# Patient Record
Sex: FEMALE | Race: WHITE | NOT HISPANIC OR LATINO | Employment: FULL TIME | URBAN - METROPOLITAN AREA
[De-identification: names, ages, dates, MRNs, and addresses within clinical notes are randomized per-mention and may not be internally consistent; named-entity substitution may affect disease eponyms.]

---

## 2017-01-03 RX ORDER — CARVEDILOL 12.5 MG/1
TABLET ORAL
Qty: 60 TABLET | Refills: 3 | Status: SHIPPED | OUTPATIENT
Start: 2017-01-03 | End: 2017-06-21 | Stop reason: SDUPTHER

## 2017-03-06 RX ORDER — VENLAFAXINE HYDROCHLORIDE 75 MG/1
CAPSULE, EXTENDED RELEASE ORAL
Qty: 90 CAPSULE | Refills: 0 | Status: SHIPPED | OUTPATIENT
Start: 2017-03-06 | End: 2017-06-05 | Stop reason: SDUPTHER

## 2017-06-05 RX ORDER — VENLAFAXINE HYDROCHLORIDE 75 MG/1
75 CAPSULE, EXTENDED RELEASE ORAL DAILY
Qty: 90 CAPSULE | Refills: 0 | Status: SHIPPED | OUTPATIENT
Start: 2017-06-05 | End: 2017-08-31 | Stop reason: SDUPTHER

## 2017-06-21 ENCOUNTER — OFFICE VISIT (OUTPATIENT)
Dept: INTERNAL MEDICINE | Facility: CLINIC | Age: 52
End: 2017-06-21

## 2017-06-21 VITALS
HEIGHT: 66 IN | WEIGHT: 125 LBS | HEART RATE: 64 BPM | DIASTOLIC BLOOD PRESSURE: 90 MMHG | BODY MASS INDEX: 20.09 KG/M2 | SYSTOLIC BLOOD PRESSURE: 160 MMHG

## 2017-06-21 DIAGNOSIS — I10 ESSENTIAL HYPERTENSION: Primary | ICD-10-CM

## 2017-06-21 DIAGNOSIS — F32.89 OTHER DEPRESSION: ICD-10-CM

## 2017-06-21 DIAGNOSIS — F51.01 PRIMARY INSOMNIA: ICD-10-CM

## 2017-06-21 PROBLEM — M26.629 ARTHRALGIA OF TEMPOROMANDIBULAR JOINT: Status: ACTIVE | Noted: 2017-06-21

## 2017-06-21 PROBLEM — F32.A DEPRESSION: Status: ACTIVE | Noted: 2017-06-21

## 2017-06-21 PROCEDURE — 99213 OFFICE O/P EST LOW 20 MIN: CPT | Performed by: INTERNAL MEDICINE

## 2017-06-21 RX ORDER — LISINOPRIL 20 MG/1
20 TABLET ORAL DAILY
Qty: 90 TABLET | Refills: 3 | Status: SHIPPED | OUTPATIENT
Start: 2017-06-21 | End: 2018-06-26 | Stop reason: SDUPTHER

## 2017-06-21 RX ORDER — HYDROCODONE BITARTRATE AND ACETAMINOPHEN 7.5; 325 MG/1; MG/1
TABLET ORAL
COMMUNITY
Start: 2017-06-03 | End: 2019-07-09

## 2017-06-21 RX ORDER — CARVEDILOL 12.5 MG/1
12.5 TABLET ORAL 2 TIMES DAILY WITH MEALS
Qty: 180 TABLET | Refills: 3 | Status: SHIPPED | OUTPATIENT
Start: 2017-06-21 | End: 2017-07-30 | Stop reason: SDUPTHER

## 2017-06-21 RX ORDER — ZOLPIDEM TARTRATE 10 MG/1
10 TABLET ORAL NIGHTLY PRN
Qty: 30 TABLET | Refills: 2 | Status: SHIPPED | OUTPATIENT
Start: 2017-06-21 | End: 2018-06-01

## 2017-06-21 RX ORDER — LISINOPRIL 20 MG/1
20 TABLET ORAL DAILY
Qty: 90 TABLET | Refills: 3 | Status: SHIPPED | OUTPATIENT
Start: 2017-06-21 | End: 2017-06-21 | Stop reason: SDUPTHER

## 2017-06-21 NOTE — PROGRESS NOTES
Patient is a 51 y.o. female who is here for elevated blood pressure.  Chief Complaint   Patient presents with   • Hypertension         HPI:  Here for f/u.  Here BP has been high. Stopped taking the lisinopril.  Having increased dizziness and lightheadedness.  Working 2 jobs.  Continues on effexor.  No ankle edema.      History:    Patient Active Problem List   Diagnosis   • Depression   • Hypertension   • Arthralgia of temporomandibular joint   • Primary insomnia       No past medical history on file.    No past surgical history on file.    Current Outpatient Prescriptions on File Prior to Visit   Medication Sig   • venlafaxine XR (EFFEXOR-XR) 75 MG 24 hr capsule Take 1 capsule by mouth Daily.   • [DISCONTINUED] carvedilol (COREG) 12.5 MG tablet TAKE ONE TABLET BY MOUTH TWICE A DAY     No current facility-administered medications on file prior to visit.        No family history on file.    Social History     Social History   • Marital status:      Spouse name: N/A   • Number of children: N/A   • Years of education: N/A     Occupational History   • Not on file.     Social History Main Topics   • Smoking status: Current Every Day Smoker   • Smokeless tobacco: Never Used   • Alcohol use No   • Drug use: No   • Sexual activity: Not on file     Other Topics Concern   • Not on file     Social History Narrative   • No narrative on file         ROS:    Review of Systems   Constitutional: Positive for fatigue. Negative for chills, diaphoresis, fever and unexpected weight change.   HENT: Negative for congestion, ear pain, hearing loss, nosebleeds, postnasal drip, sinus pressure and sore throat.    Eyes: Negative for pain, discharge and itching.   Respiratory: Negative for cough, chest tightness, shortness of breath and wheezing.    Cardiovascular: Negative for chest pain, palpitations and leg swelling.   Gastrointestinal: Negative for abdominal distention, abdominal pain, blood in stool, constipation, diarrhea, nausea  "and vomiting.   Endocrine: Negative for polydipsia and polyuria.   Genitourinary: Negative for difficulty urinating, dysuria, frequency and hematuria.   Musculoskeletal: Negative for arthralgias, back pain, gait problem, joint swelling and myalgias.   Skin: Negative for rash and wound.   Neurological: Positive for light-headedness and headaches. Negative for dizziness, syncope and weakness.   Psychiatric/Behavioral: Negative for dysphoric mood and sleep disturbance. The patient is not nervous/anxious.        /90  Pulse 64  Ht 66\" (167.6 cm)  Wt 125 lb (56.7 kg)  BMI 20.18 kg/m2    Physical Exam:    Physical Exam   Constitutional: She is oriented to person, place, and time. She appears well-developed and well-nourished.   HENT:   Head: Normocephalic and atraumatic.   Right Ear: External ear normal.   Left Ear: External ear normal.   Mouth/Throat: Oropharynx is clear and moist.   Eyes: Conjunctivae and EOM are normal.   Neck: Normal range of motion. Neck supple.   Cardiovascular: Normal rate, regular rhythm and normal heart sounds.    Pulmonary/Chest: Effort normal and breath sounds normal.   Abdominal: Soft. Bowel sounds are normal.   Musculoskeletal: Normal range of motion.   Lymphadenopathy:     She has no cervical adenopathy.   Neurological: She is alert and oriented to person, place, and time.   Skin: Skin is warm and dry.   Psychiatric: She has a normal mood and affect. Her behavior is normal. Thought content normal.       Procedure:      Discussion/Summary:  htn-restart lisinopril  depression-cont effexor  Other-advised mammogram and colonoscopy      Current Outpatient Prescriptions:   •  carvedilol (COREG) 12.5 MG tablet, Take 1 tablet by mouth 2 (Two) Times a Day With Meals., Disp: 180 tablet, Rfl: 3  •  HYDROcodone-acetaminophen (NORCO) 7.5-325 MG per tablet, , Disp: , Rfl:   •  venlafaxine XR (EFFEXOR-XR) 75 MG 24 hr capsule, Take 1 capsule by mouth Daily., Disp: 90 capsule, Rfl: 0  •  lisinopril " (PRINIVIL,ZESTRIL) 20 MG tablet, Take 1 tablet by mouth Daily., Disp: 90 tablet, Rfl: 3  •  zolpidem (AMBIEN) 10 MG tablet, Take 1 tablet by mouth At Night As Needed for Sleep., Disp: 30 tablet, Rfl: 2        Demetria was seen today for hypertension.    Diagnoses and all orders for this visit:    Essential hypertension  -     Discontinue: lisinopril (PRINIVIL,ZESTRIL) 20 MG tablet; Take 1 tablet by mouth Daily.  -     carvedilol (COREG) 12.5 MG tablet; Take 1 tablet by mouth 2 (Two) Times a Day With Meals.  -     lisinopril (PRINIVIL,ZESTRIL) 20 MG tablet; Take 1 tablet by mouth Daily.    Other depression    Primary insomnia  -     zolpidem (AMBIEN) 10 MG tablet; Take 1 tablet by mouth At Night As Needed for Sleep.

## 2017-07-30 DIAGNOSIS — I10 ESSENTIAL HYPERTENSION: ICD-10-CM

## 2017-07-31 RX ORDER — CARVEDILOL 12.5 MG/1
TABLET ORAL
Qty: 60 TABLET | Refills: 2 | Status: SHIPPED | OUTPATIENT
Start: 2017-07-31 | End: 2017-12-28 | Stop reason: SDUPTHER

## 2017-08-31 RX ORDER — VENLAFAXINE HYDROCHLORIDE 75 MG/1
CAPSULE, EXTENDED RELEASE ORAL
Qty: 30 CAPSULE | Refills: 2 | Status: SHIPPED | OUTPATIENT
Start: 2017-08-31 | End: 2018-05-28 | Stop reason: SDUPTHER

## 2017-11-17 ENCOUNTER — TELEPHONE (OUTPATIENT)
Dept: INTERNAL MEDICINE | Facility: CLINIC | Age: 52
End: 2017-11-17

## 2017-11-17 NOTE — TELEPHONE ENCOUNTER
PATIENT CALLED WANTING TO BE SEEN BEFORE January 19, 2018; SHE'S HAVING BLOOD PRESSURE ISSUES; HER GLANDS IN HER NECK ARE SWOLLEN AND SHE'S HAVING PAIN IN HER RIBS. I TOLD HER ABOUT URGENT CARE AND SHE SAID SHE DIDN'T WANT TO GO THERE. PLEASE GIVE PATIENT A CALL

## 2017-12-28 DIAGNOSIS — I10 ESSENTIAL HYPERTENSION: ICD-10-CM

## 2017-12-28 RX ORDER — CARVEDILOL 12.5 MG/1
TABLET ORAL
Qty: 60 TABLET | Refills: 1 | Status: SHIPPED | OUTPATIENT
Start: 2017-12-28 | End: 2018-04-27 | Stop reason: SDUPTHER

## 2018-04-27 DIAGNOSIS — I10 ESSENTIAL HYPERTENSION: ICD-10-CM

## 2018-04-27 RX ORDER — CARVEDILOL 12.5 MG/1
TABLET ORAL
Qty: 60 TABLET | Refills: 2 | Status: SHIPPED | OUTPATIENT
Start: 2018-04-27 | End: 2018-06-01 | Stop reason: SDUPTHER

## 2018-05-29 RX ORDER — VENLAFAXINE HYDROCHLORIDE 75 MG/1
CAPSULE, EXTENDED RELEASE ORAL
Qty: 30 CAPSULE | Refills: 1 | Status: SHIPPED | OUTPATIENT
Start: 2018-05-29 | End: 2018-06-01

## 2018-06-01 ENCOUNTER — OFFICE VISIT (OUTPATIENT)
Dept: INTERNAL MEDICINE | Facility: CLINIC | Age: 53
End: 2018-06-01

## 2018-06-01 VITALS
HEART RATE: 76 BPM | HEIGHT: 66 IN | BODY MASS INDEX: 19.93 KG/M2 | WEIGHT: 124 LBS | SYSTOLIC BLOOD PRESSURE: 150 MMHG | DIASTOLIC BLOOD PRESSURE: 90 MMHG

## 2018-06-01 DIAGNOSIS — F32.89 OTHER DEPRESSION: ICD-10-CM

## 2018-06-01 DIAGNOSIS — I10 ESSENTIAL HYPERTENSION: Primary | ICD-10-CM

## 2018-06-01 DIAGNOSIS — Z12.11 SCREEN FOR COLON CANCER: ICD-10-CM

## 2018-06-01 DIAGNOSIS — R73.09 ABNORMAL GLUCOSE: ICD-10-CM

## 2018-06-01 DIAGNOSIS — F51.01 PRIMARY INSOMNIA: ICD-10-CM

## 2018-06-01 LAB
ALBUMIN SERPL-MCNC: 4.3 G/DL (ref 3.2–4.8)
ALBUMIN/GLOB SERPL: 1.7 G/DL (ref 1.5–2.5)
ALP SERPL-CCNC: 108 U/L (ref 25–100)
ALT SERPL W P-5'-P-CCNC: 14 U/L (ref 7–40)
ANION GAP SERPL CALCULATED.3IONS-SCNC: 7 MMOL/L (ref 3–11)
ARTICHOKE IGE QN: 145 MG/DL (ref 0–130)
AST SERPL-CCNC: 22 U/L (ref 0–33)
BILIRUB BLD-MCNC: NEGATIVE MG/DL
BILIRUB SERPL-MCNC: 0.6 MG/DL (ref 0.3–1.2)
BUN BLD-MCNC: 7 MG/DL (ref 9–23)
BUN/CREAT SERPL: 10 (ref 7–25)
CALCIUM SPEC-SCNC: 9.2 MG/DL (ref 8.7–10.4)
CHLORIDE SERPL-SCNC: 110 MMOL/L (ref 99–109)
CHOLEST SERPL-MCNC: 195 MG/DL (ref 0–200)
CLARITY, POC: CLEAR
CO2 SERPL-SCNC: 26 MMOL/L (ref 20–31)
COLOR UR: YELLOW
CREAT BLD-MCNC: 0.7 MG/DL (ref 0.6–1.3)
DEPRECATED RDW RBC AUTO: 46.2 FL (ref 37–54)
ERYTHROCYTE [DISTWIDTH] IN BLOOD BY AUTOMATED COUNT: 13.3 % (ref 11.3–14.5)
GFR SERPL CREATININE-BSD FRML MDRD: 88 ML/MIN/1.73
GLOBULIN UR ELPH-MCNC: 2.5 GM/DL
GLUCOSE BLD-MCNC: 102 MG/DL (ref 70–100)
GLUCOSE UR STRIP-MCNC: NEGATIVE MG/DL
HBA1C MFR BLD: 5.6 % (ref 4.8–5.6)
HCT VFR BLD AUTO: 45.7 % (ref 34.5–44)
HDLC SERPL-MCNC: 44 MG/DL (ref 40–60)
HGB BLD-MCNC: 15.1 G/DL (ref 11.5–15.5)
KETONES UR QL: NEGATIVE
LEUKOCYTE EST, POC: NEGATIVE
MCH RBC QN AUTO: 31.7 PG (ref 27–31)
MCHC RBC AUTO-ENTMCNC: 33 G/DL (ref 32–36)
MCV RBC AUTO: 95.8 FL (ref 80–99)
NITRITE UR-MCNC: NEGATIVE MG/ML
PH UR: 6.5 [PH] (ref 5–8)
PLATELET # BLD AUTO: 237 10*3/MM3 (ref 150–450)
PMV BLD AUTO: 10.4 FL (ref 6–12)
POTASSIUM BLD-SCNC: 4.1 MMOL/L (ref 3.5–5.5)
PROT SERPL-MCNC: 6.8 G/DL (ref 5.7–8.2)
PROT UR STRIP-MCNC: NEGATIVE MG/DL
RBC # BLD AUTO: 4.77 10*6/MM3 (ref 3.89–5.14)
RBC # UR STRIP: NEGATIVE /UL
SODIUM BLD-SCNC: 143 MMOL/L (ref 132–146)
SP GR UR: 1 (ref 1–1.03)
TRIGL SERPL-MCNC: 147 MG/DL (ref 0–150)
TSH SERPL DL<=0.05 MIU/L-ACNC: 3.25 MIU/ML (ref 0.35–5.35)
UROBILINOGEN UR QL: NORMAL
VIT B12 BLD-MCNC: 259 PG/ML (ref 211–911)
WBC NRBC COR # BLD: 9.02 10*3/MM3 (ref 3.5–10.8)

## 2018-06-01 PROCEDURE — 83036 HEMOGLOBIN GLYCOSYLATED A1C: CPT | Performed by: INTERNAL MEDICINE

## 2018-06-01 PROCEDURE — 81003 URINALYSIS AUTO W/O SCOPE: CPT | Performed by: INTERNAL MEDICINE

## 2018-06-01 PROCEDURE — 84443 ASSAY THYROID STIM HORMONE: CPT | Performed by: INTERNAL MEDICINE

## 2018-06-01 PROCEDURE — 85027 COMPLETE CBC AUTOMATED: CPT | Performed by: INTERNAL MEDICINE

## 2018-06-01 PROCEDURE — 80053 COMPREHEN METABOLIC PANEL: CPT | Performed by: INTERNAL MEDICINE

## 2018-06-01 PROCEDURE — 82607 VITAMIN B-12: CPT | Performed by: INTERNAL MEDICINE

## 2018-06-01 PROCEDURE — 99214 OFFICE O/P EST MOD 30 MIN: CPT | Performed by: INTERNAL MEDICINE

## 2018-06-01 PROCEDURE — 80061 LIPID PANEL: CPT | Performed by: INTERNAL MEDICINE

## 2018-06-01 RX ORDER — CARVEDILOL 25 MG/1
25 TABLET ORAL 2 TIMES DAILY WITH MEALS
Qty: 60 TABLET | Refills: 5 | Status: SHIPPED | OUTPATIENT
Start: 2018-06-01 | End: 2021-06-07

## 2018-06-01 RX ORDER — GABAPENTIN 100 MG/1
CAPSULE ORAL
COMMUNITY
Start: 2018-05-15 | End: 2021-06-07

## 2018-06-01 RX ORDER — DULOXETIN HYDROCHLORIDE 60 MG/1
60 CAPSULE, DELAYED RELEASE ORAL DAILY
Qty: 30 CAPSULE | Refills: 5 | Status: SHIPPED | OUTPATIENT
Start: 2018-06-01 | End: 2018-11-27 | Stop reason: SDUPTHER

## 2018-06-01 NOTE — PROGRESS NOTES
Patient is a 52 y.o. female who is here for a follow up of chronic conditions.  Chief Complaint   Patient presents with   • Hypertension   • Depression         HPI:    Here for f/u.  More depressed.  Feels anxious also.  Sleeping well.  Not using ambien.  Motivation is poor.  Poor concentration.  Working a lot.  Recently placed on gabapentin for feet burning.  Occasional palpitations.  Compliant with BP meds.   History:    Patient Active Problem List   Diagnosis   • Depression   • Hypertension   • Arthralgia of temporomandibular joint   • Primary insomnia       No past medical history on file.    No past surgical history on file.    Current Outpatient Prescriptions on File Prior to Visit   Medication Sig   • HYDROcodone-acetaminophen (NORCO) 7.5-325 MG per tablet    • lisinopril (PRINIVIL,ZESTRIL) 20 MG tablet Take 1 tablet by mouth Daily.   • [DISCONTINUED] carvedilol (COREG) 12.5 MG tablet TAKE ONE TABLET BY MOUTH TWICE A DAY   • [DISCONTINUED] venlafaxine XR (EFFEXOR-XR) 75 MG 24 hr capsule TAKE ONE CAPSULE BY MOUTH DAILY   • [DISCONTINUED] zolpidem (AMBIEN) 10 MG tablet Take 1 tablet by mouth At Night As Needed for Sleep.     No current facility-administered medications on file prior to visit.        No family history on file.    Social History     Social History   • Marital status:      Spouse name: N/A   • Number of children: N/A   • Years of education: N/A     Occupational History   • Not on file.     Social History Main Topics   • Smoking status: Current Every Day Smoker   • Smokeless tobacco: Never Used   • Alcohol use No   • Drug use: No   • Sexual activity: Not on file     Other Topics Concern   • Not on file     Social History Narrative   • No narrative on file         ROS:    Review of Systems   Constitutional: Positive for fatigue. Negative for chills, diaphoresis, fever and unexpected weight change.   HENT: Negative for congestion, ear pain, hearing loss, nosebleeds, postnasal drip, sinus  "pressure and sore throat.    Eyes: Negative for pain, discharge and itching.   Respiratory: Negative for cough, chest tightness, shortness of breath and wheezing.    Cardiovascular: Positive for palpitations. Negative for chest pain and leg swelling.   Gastrointestinal: Negative for abdominal distention, abdominal pain, blood in stool, constipation, diarrhea, nausea and vomiting.   Endocrine: Negative for polydipsia and polyuria.   Genitourinary: Negative for difficulty urinating, dysuria, frequency and hematuria.   Musculoskeletal: Negative for arthralgias, back pain, gait problem, joint swelling and myalgias.   Skin: Negative for rash and wound.   Neurological: Positive for light-headedness and headaches. Negative for dizziness, syncope and weakness.   Psychiatric/Behavioral: Negative for dysphoric mood and sleep disturbance. The patient is not nervous/anxious.        /90   Pulse 76   Ht 167.6 cm (65.98\")   Wt 56.2 kg (124 lb)   BMI 20.02 kg/m²     Physical Exam:    Physical Exam   Constitutional: She is oriented to person, place, and time. She appears well-developed and well-nourished.   HENT:   Head: Normocephalic and atraumatic.   Right Ear: External ear normal.   Left Ear: External ear normal.   Mouth/Throat: Oropharynx is clear and moist.   Eyes: Conjunctivae and EOM are normal.   Neck: Normal range of motion. Neck supple.   Cardiovascular: Normal rate, regular rhythm and normal heart sounds.    Pulmonary/Chest: Effort normal and breath sounds normal.   Abdominal: Soft. Bowel sounds are normal.   Musculoskeletal: Normal range of motion. She exhibits tenderness (diffuse).   Lymphadenopathy:     She has no cervical adenopathy.   Neurological: She is alert and oriented to person, place, and time.   Skin: Skin is warm and dry.   Psychiatric: She has a normal mood and affect. Her behavior is normal. Thought content normal.       Procedure:      Discussion/Summary:    Htn/palpitations-increase " coreg  depression-wean to duloxetine  LE paresthesia-cont gabapentin, duloxetine should help  Other-labs today and schedule screening colonoscopy    Labs noted and dw patient, advised MVI qd and will mail out diet handout      Current Outpatient Prescriptions:   •  carvedilol (COREG) 25 MG tablet, Take 1 tablet by mouth 2 (Two) Times a Day With Meals., Disp: 60 tablet, Rfl: 5  •  gabapentin (NEURONTIN) 100 MG capsule, , Disp: , Rfl:   •  HYDROcodone-acetaminophen (NORCO) 7.5-325 MG per tablet, , Disp: , Rfl:   •  lisinopril (PRINIVIL,ZESTRIL) 20 MG tablet, Take 1 tablet by mouth Daily., Disp: 90 tablet, Rfl: 3  •  DULoxetine (CYMBALTA) 60 MG capsule, Take 1 capsule by mouth Daily., Disp: 30 capsule, Rfl: 5        Demetria was seen today for hypertension and depression.    Diagnoses and all orders for this visit:    Essential hypertension  -     carvedilol (COREG) 25 MG tablet; Take 1 tablet by mouth 2 (Two) Times a Day With Meals.  -     Comprehensive Metabolic Panel  -     Lipid Panel  -     POC Urinalysis Dipstick, Automated    Other depression  -     DULoxetine (CYMBALTA) 60 MG capsule; Take 1 capsule by mouth Daily.  -     CBC (No Diff)  -     TSH  -     Vitamin B12    Primary insomnia    Screen for colon cancer  -     Ambulatory Referral For Screening Colonoscopy    Abnormal glucose  -     Hemoglobin A1c

## 2018-06-26 DIAGNOSIS — I10 ESSENTIAL HYPERTENSION: ICD-10-CM

## 2018-06-26 RX ORDER — LISINOPRIL 20 MG/1
TABLET ORAL
Qty: 30 TABLET | Refills: 2 | Status: SHIPPED | OUTPATIENT
Start: 2018-06-26 | End: 2018-09-27 | Stop reason: SDUPTHER

## 2018-07-25 DIAGNOSIS — Z12.11 SCREENING FOR COLON CANCER: Primary | ICD-10-CM

## 2018-09-10 RX ORDER — CARVEDILOL 12.5 MG/1
TABLET ORAL
Qty: 60 TABLET | Refills: 1 | Status: SHIPPED | OUTPATIENT
Start: 2018-09-10 | End: 2018-11-28 | Stop reason: SDUPTHER

## 2018-09-27 DIAGNOSIS — I10 ESSENTIAL HYPERTENSION: ICD-10-CM

## 2018-09-27 RX ORDER — LISINOPRIL 20 MG/1
TABLET ORAL
Qty: 30 TABLET | Refills: 1 | Status: SHIPPED | OUTPATIENT
Start: 2018-09-27 | End: 2018-11-28 | Stop reason: SDUPTHER

## 2018-11-27 DIAGNOSIS — F32.89 OTHER DEPRESSION: ICD-10-CM

## 2018-11-27 RX ORDER — DULOXETIN HYDROCHLORIDE 60 MG/1
CAPSULE, DELAYED RELEASE ORAL
Qty: 10 CAPSULE | Refills: 4 | Status: SHIPPED | OUTPATIENT
Start: 2018-11-27 | End: 2019-04-27 | Stop reason: SDUPTHER

## 2018-11-28 DIAGNOSIS — I10 ESSENTIAL HYPERTENSION: ICD-10-CM

## 2018-11-28 RX ORDER — LISINOPRIL 20 MG/1
TABLET ORAL
Qty: 30 TABLET | Refills: 0 | Status: SHIPPED | OUTPATIENT
Start: 2018-11-28 | End: 2018-12-31 | Stop reason: SDUPTHER

## 2018-11-28 RX ORDER — CARVEDILOL 12.5 MG/1
TABLET ORAL
Qty: 60 TABLET | Refills: 0 | Status: SHIPPED | OUTPATIENT
Start: 2018-11-28 | End: 2018-12-31 | Stop reason: SDUPTHER

## 2018-12-31 DIAGNOSIS — I10 ESSENTIAL HYPERTENSION: ICD-10-CM

## 2018-12-31 RX ORDER — LISINOPRIL 20 MG/1
TABLET ORAL
Qty: 30 TABLET | Refills: 0 | Status: SHIPPED | OUTPATIENT
Start: 2018-12-31 | End: 2019-01-29 | Stop reason: SDUPTHER

## 2018-12-31 RX ORDER — CARVEDILOL 12.5 MG/1
TABLET ORAL
Qty: 60 TABLET | Refills: 0 | Status: SHIPPED | OUTPATIENT
Start: 2018-12-31 | End: 2019-01-29 | Stop reason: SDUPTHER

## 2019-01-29 DIAGNOSIS — I10 ESSENTIAL HYPERTENSION: ICD-10-CM

## 2019-01-29 RX ORDER — LISINOPRIL 20 MG/1
TABLET ORAL
Qty: 30 TABLET | Refills: 0 | Status: SHIPPED | OUTPATIENT
Start: 2019-01-29 | End: 2019-02-27 | Stop reason: SDUPTHER

## 2019-01-29 RX ORDER — CARVEDILOL 12.5 MG/1
TABLET ORAL
Qty: 60 TABLET | Refills: 0 | Status: SHIPPED | OUTPATIENT
Start: 2019-01-29 | End: 2019-07-09

## 2019-02-27 DIAGNOSIS — I10 ESSENTIAL HYPERTENSION: ICD-10-CM

## 2019-02-27 RX ORDER — LISINOPRIL 20 MG/1
TABLET ORAL
Qty: 30 TABLET | Refills: 0 | Status: SHIPPED | OUTPATIENT
Start: 2019-02-27 | End: 2019-03-28 | Stop reason: SDUPTHER

## 2019-03-28 DIAGNOSIS — I10 ESSENTIAL HYPERTENSION: ICD-10-CM

## 2019-03-28 RX ORDER — LISINOPRIL 20 MG/1
TABLET ORAL
Qty: 30 TABLET | Refills: 5 | Status: SHIPPED | OUTPATIENT
Start: 2019-03-28 | End: 2019-09-26 | Stop reason: SDUPTHER

## 2019-04-27 DIAGNOSIS — F32.89 OTHER DEPRESSION: ICD-10-CM

## 2019-04-29 RX ORDER — DULOXETIN HYDROCHLORIDE 60 MG/1
CAPSULE, DELAYED RELEASE ORAL
Qty: 30 CAPSULE | Refills: 3 | Status: SHIPPED | OUTPATIENT
Start: 2019-04-29 | End: 2019-08-28 | Stop reason: SDUPTHER

## 2019-07-09 ENCOUNTER — OFFICE VISIT (OUTPATIENT)
Dept: INTERNAL MEDICINE | Facility: CLINIC | Age: 54
End: 2019-07-09

## 2019-07-09 VITALS
WEIGHT: 120 LBS | DIASTOLIC BLOOD PRESSURE: 90 MMHG | HEART RATE: 68 BPM | BODY MASS INDEX: 19.29 KG/M2 | SYSTOLIC BLOOD PRESSURE: 160 MMHG | HEIGHT: 66 IN

## 2019-07-09 DIAGNOSIS — F51.01 PRIMARY INSOMNIA: ICD-10-CM

## 2019-07-09 DIAGNOSIS — D22.9 ATYPICAL MOLE: ICD-10-CM

## 2019-07-09 DIAGNOSIS — I10 ESSENTIAL HYPERTENSION: Primary | ICD-10-CM

## 2019-07-09 DIAGNOSIS — F32.89 OTHER DEPRESSION: ICD-10-CM

## 2019-07-09 DIAGNOSIS — M25.549 PAIN IN MULTIPLE FINGER JOINTS: ICD-10-CM

## 2019-07-09 LAB
BILIRUB BLD-MCNC: NEGATIVE MG/DL
CLARITY, POC: CLEAR
COLOR UR: YELLOW
GLUCOSE UR STRIP-MCNC: NEGATIVE MG/DL
KETONES UR QL: NEGATIVE
LEUKOCYTE EST, POC: NEGATIVE
NITRITE UR-MCNC: NEGATIVE MG/ML
PH UR: 7 [PH] (ref 5–8)
PROT UR STRIP-MCNC: NEGATIVE MG/DL
RBC # UR STRIP: NEGATIVE /UL
SP GR UR: 1 (ref 1–1.03)
UROBILINOGEN UR QL: NORMAL

## 2019-07-09 PROCEDURE — 86431 RHEUMATOID FACTOR QUANT: CPT | Performed by: INTERNAL MEDICINE

## 2019-07-09 PROCEDURE — 80053 COMPREHEN METABOLIC PANEL: CPT | Performed by: INTERNAL MEDICINE

## 2019-07-09 PROCEDURE — 84443 ASSAY THYROID STIM HORMONE: CPT | Performed by: INTERNAL MEDICINE

## 2019-07-09 PROCEDURE — 82306 VITAMIN D 25 HYDROXY: CPT | Performed by: INTERNAL MEDICINE

## 2019-07-09 PROCEDURE — 86140 C-REACTIVE PROTEIN: CPT | Performed by: INTERNAL MEDICINE

## 2019-07-09 PROCEDURE — 86038 ANTINUCLEAR ANTIBODIES: CPT | Performed by: INTERNAL MEDICINE

## 2019-07-09 PROCEDURE — 85027 COMPLETE CBC AUTOMATED: CPT | Performed by: INTERNAL MEDICINE

## 2019-07-09 PROCEDURE — 84550 ASSAY OF BLOOD/URIC ACID: CPT | Performed by: INTERNAL MEDICINE

## 2019-07-09 PROCEDURE — 81003 URINALYSIS AUTO W/O SCOPE: CPT | Performed by: INTERNAL MEDICINE

## 2019-07-09 PROCEDURE — 86200 CCP ANTIBODY: CPT | Performed by: INTERNAL MEDICINE

## 2019-07-09 PROCEDURE — 99214 OFFICE O/P EST MOD 30 MIN: CPT | Performed by: INTERNAL MEDICINE

## 2019-07-09 PROCEDURE — 85652 RBC SED RATE AUTOMATED: CPT | Performed by: INTERNAL MEDICINE

## 2019-07-09 NOTE — PROGRESS NOTES
Patient is a 53 y.o. female who is here for a follow up of chronic conditions.  Chief Complaint   Patient presents with   • Hypertension         HPI:    Here for f/u.  Past 2 weeks with dysuria and low grade temp.  Some incomplete emptying and nocturia.    Also having increased rib pain and in feet and hands.  Has hx of psoriasis.  Difficulty hearing from left ear.  No recent swimming.      History:     Patient Active Problem List   Diagnosis   • Depression   • Hypertension   • Arthralgia of temporomandibular joint   • Primary insomnia   • Pain in multiple finger joints   • Atypical mole       No past medical history on file.    No past surgical history on file.    Current Outpatient Medications on File Prior to Visit   Medication Sig   • carvedilol (COREG) 25 MG tablet Take 1 tablet by mouth 2 (Two) Times a Day With Meals.   • DULoxetine (CYMBALTA) 60 MG capsule TAKE ONE CAPSULE BY MOUTH DAILY   • gabapentin (NEURONTIN) 100 MG capsule    • lisinopril (PRINIVIL,ZESTRIL) 20 MG tablet TAKE ONE TABLET BY MOUTH DAILY   • [DISCONTINUED] carvedilol (COREG) 12.5 MG tablet TAKE ONE TABLET BY MOUTH TWICE A DAY   • [DISCONTINUED] HYDROcodone-acetaminophen (NORCO) 7.5-325 MG per tablet    • [DISCONTINUED] Sod Picosulfate-Mag Ox-Cit Acd 10-3.5-12 MG-GM -GM/160ML solution Take 1 kit by mouth Take As Directed. Follow instructions that were mailed to your home. If you didn't receive these call [phone].     No current facility-administered medications on file prior to visit.        No family history on file.    Social History     Socioeconomic History   • Marital status:      Spouse name: Not on file   • Number of children: Not on file   • Years of education: Not on file   • Highest education level: Not on file   Tobacco Use   • Smoking status: Current Every Day Smoker   • Smokeless tobacco: Never Used   Substance and Sexual Activity   • Alcohol use: No   • Drug use: No         Review of Systems   Constitutional:  "Positive for fatigue. Negative for chills, diaphoresis, fever and unexpected weight change.   HENT: Positive for hearing loss. Negative for congestion, ear pain, nosebleeds, postnasal drip, sinus pressure and sore throat.    Eyes: Negative for pain, discharge and itching.   Respiratory: Negative for cough, chest tightness, shortness of breath and wheezing.    Cardiovascular: Positive for palpitations. Negative for chest pain and leg swelling.   Gastrointestinal: Negative for abdominal distention, abdominal pain, blood in stool, constipation, diarrhea, nausea and vomiting.   Endocrine: Negative for polydipsia and polyuria.   Genitourinary: Positive for frequency and urgency. Negative for difficulty urinating, dysuria and hematuria.   Musculoskeletal: Positive for arthralgias, back pain and myalgias. Negative for gait problem and joint swelling.   Skin: Negative for rash and wound.   Neurological: Positive for light-headedness and headaches. Negative for dizziness, syncope and weakness.   Psychiatric/Behavioral: Positive for dysphoric mood. Negative for sleep disturbance. The patient is not nervous/anxious.        /90   Pulse 68   Ht 167.6 cm (66\")   Wt 54.4 kg (120 lb)   BMI 19.37 kg/m²       Physical Exam   Constitutional: She is oriented to person, place, and time. She appears well-developed and well-nourished.   HENT:   Head: Normocephalic and atraumatic.   Right Ear: External ear normal.   Left Ear: External ear normal.   Mouth/Throat: Oropharynx is clear and moist.   Eyes: Conjunctivae and EOM are normal.   Neck: Normal range of motion. Neck supple.   Cardiovascular: Normal rate, regular rhythm and normal heart sounds.   Pulmonary/Chest: Effort normal and breath sounds normal.   Abdominal: Soft. Bowel sounds are normal.   Musculoskeletal: Normal range of motion. She exhibits tenderness (diffuse).   Pain on flexion past 45 degrees   Lymphadenopathy:     She has no cervical adenopathy.   Neurological: " She is alert and oriented to person, place, and time.   Skin: Skin is warm and dry.   Breast, Left- with irregular SD on 6 oclock   Psychiatric: She has a normal mood and affect. Her behavior is normal. Thought content normal.       Procedure:      Discussion/Summary:    Htn/palpitations-increase coreg 1/2 bid  depression-cont duloxetine, add abilify  LE paresthesia-cont gabapentin and duloxetine  Left breast SD-plastic surgery to see  Pain in multiple joints-labs today  Other-labs today and advised mammogram and colonoscopy     Labs noted and dw patient, advised MVI qd  Advised Vit D 2000 IU qd        Current Outpatient Medications:   •  carvedilol (COREG) 25 MG tablet, Take 1 tablet by mouth 2 (Two) Times a Day With Meals., Disp: 60 tablet, Rfl: 5  •  DULoxetine (CYMBALTA) 60 MG capsule, TAKE ONE CAPSULE BY MOUTH DAILY, Disp: 30 capsule, Rfl: 3  •  gabapentin (NEURONTIN) 100 MG capsule, , Disp: , Rfl:   •  lisinopril (PRINIVIL,ZESTRIL) 20 MG tablet, TAKE ONE TABLET BY MOUTH DAILY, Disp: 30 tablet, Rfl: 5  •  ARIPiprazole, sensor, (ABILIFY MYCITE) 2 MG tablet, Take 1 tablet by mouth Every Morning., Disp: 30 tablet, Rfl: 5        Demetria was seen today for hypertension.    Diagnoses and all orders for this visit:    Essential hypertension  -     CBC (No Diff)  -     Comprehensive Metabolic Panel  -     POC Urinalysis Dipstick, Automated    Primary insomnia    Other depression  -     TSH  -     ARIPiprazole, sensor, (ABILIFY MYCITE) 2 MG tablet; Take 1 tablet by mouth Every Morning.    Pain in multiple finger joints  -     Vitamin D 25 Hydroxy  -     MIRIAM With / DsDNA, RNP, Sjogrens A / B, Smith  -     C-reactive Protein  -     Cyclic Citrul Peptide Antibody, IgG / IgA  -     Rheumatoid Factor  -     Sedimentation Rate  -     Uric Acid    Atypical mole  -     Ambulatory Referral to Plastic Surgery

## 2019-07-10 LAB
25(OH)D3 SERPL-MCNC: 17.8 NG/ML (ref 30–100)
ALBUMIN SERPL-MCNC: 4.3 G/DL (ref 3.5–5.2)
ALBUMIN/GLOB SERPL: 1.7 G/DL
ALP SERPL-CCNC: 87 U/L (ref 39–117)
ALT SERPL W P-5'-P-CCNC: 14 U/L (ref 1–33)
ANION GAP SERPL CALCULATED.3IONS-SCNC: 12 MMOL/L (ref 5–15)
AST SERPL-CCNC: 18 U/L (ref 1–32)
BILIRUB SERPL-MCNC: 0.5 MG/DL (ref 0.2–1.2)
BUN BLD-MCNC: 7 MG/DL (ref 6–20)
BUN/CREAT SERPL: 10.1 (ref 7–25)
CALCIUM SPEC-SCNC: 9.8 MG/DL (ref 8.6–10.5)
CHLORIDE SERPL-SCNC: 104 MMOL/L (ref 98–107)
CHROMATIN AB SERPL-ACNC: <10 IU/ML (ref 0–14)
CO2 SERPL-SCNC: 27 MMOL/L (ref 22–29)
CREAT BLD-MCNC: 0.69 MG/DL (ref 0.57–1)
CRP SERPL-MCNC: <0.03 MG/DL (ref 0–0.5)
DEPRECATED RDW RBC AUTO: 48.3 FL (ref 37–54)
ERYTHROCYTE [DISTWIDTH] IN BLOOD BY AUTOMATED COUNT: 13 % (ref 12.3–15.4)
ERYTHROCYTE [SEDIMENTATION RATE] IN BLOOD: 4 MM/HR (ref 0–30)
GFR SERPL CREATININE-BSD FRML MDRD: 89 ML/MIN/1.73
GLOBULIN UR ELPH-MCNC: 2.5 GM/DL
GLUCOSE BLD-MCNC: 74 MG/DL (ref 65–99)
HCT VFR BLD AUTO: 46.5 % (ref 34–46.6)
HGB BLD-MCNC: 14.2 G/DL (ref 12–15.9)
MCH RBC QN AUTO: 30.6 PG (ref 26.6–33)
MCHC RBC AUTO-ENTMCNC: 30.5 G/DL (ref 31.5–35.7)
MCV RBC AUTO: 100.2 FL (ref 79–97)
PLATELET # BLD AUTO: 221 10*3/MM3 (ref 140–450)
PMV BLD AUTO: 10.6 FL (ref 6–12)
POTASSIUM BLD-SCNC: 4.3 MMOL/L (ref 3.5–5.2)
PROT SERPL-MCNC: 6.8 G/DL (ref 6–8.5)
RBC # BLD AUTO: 4.64 10*6/MM3 (ref 3.77–5.28)
SODIUM BLD-SCNC: 143 MMOL/L (ref 136–145)
TSH SERPL DL<=0.05 MIU/L-ACNC: 2.07 MIU/ML (ref 0.27–4.2)
URATE SERPL-MCNC: 3.7 MG/DL (ref 2.4–5.7)
WBC NRBC COR # BLD: 7.21 10*3/MM3 (ref 3.4–10.8)

## 2019-07-11 LAB — ANA SER QL: NEGATIVE

## 2019-07-12 DIAGNOSIS — D22.9 ATYPICAL MOLE: Primary | ICD-10-CM

## 2019-07-12 LAB — CCP IGA+IGG SERPL IA-ACNC: 7 UNITS (ref 0–19)

## 2019-08-19 RX ORDER — CARVEDILOL 12.5 MG/1
TABLET ORAL
Qty: 60 TABLET | Refills: 5 | Status: SHIPPED | OUTPATIENT
Start: 2019-08-19 | End: 2020-06-10

## 2019-08-21 RX ORDER — ARIPIPRAZOLE 2 MG/1
2 TABLET ORAL DAILY
Qty: 30 TABLET | Refills: 5 | Status: SHIPPED | OUTPATIENT
Start: 2019-08-21 | End: 2020-06-15

## 2019-08-28 DIAGNOSIS — F32.89 OTHER DEPRESSION: ICD-10-CM

## 2019-08-28 RX ORDER — DULOXETIN HYDROCHLORIDE 60 MG/1
60 CAPSULE, DELAYED RELEASE ORAL DAILY
Qty: 30 CAPSULE | Refills: 3 | Status: SHIPPED | OUTPATIENT
Start: 2019-08-28 | End: 2019-12-26

## 2019-09-24 ENCOUNTER — TELEPHONE (OUTPATIENT)
Dept: URGENT CARE | Facility: CLINIC | Age: 54
End: 2019-09-24

## 2019-09-24 NOTE — TELEPHONE ENCOUNTER
Terrible cough for more than a week.  Coughing fit at work today.  Felt like chest was hurting and hands/feet turned red.  Tachycardic.  Nurse at work states it might be walking PNA.  Was told by PCP and Nurse at work to go to ER.  Patient states she is trying to avoid ER due to cost.  She asked PCP what other options she has and they told her to come here.  Per KIN Nair PA-C, advised patient she can be evaluated here but still may end up being sent to ER.  Patient stated understanding and that she will start here.

## 2019-09-26 DIAGNOSIS — I10 ESSENTIAL HYPERTENSION: ICD-10-CM

## 2019-09-26 RX ORDER — LISINOPRIL 20 MG/1
TABLET ORAL
Qty: 30 TABLET | Refills: 4 | Status: SHIPPED | OUTPATIENT
Start: 2019-09-26 | End: 2020-03-02

## 2019-12-26 DIAGNOSIS — F32.89 OTHER DEPRESSION: ICD-10-CM

## 2019-12-26 RX ORDER — DULOXETIN HYDROCHLORIDE 60 MG/1
CAPSULE, DELAYED RELEASE ORAL
Qty: 30 CAPSULE | Refills: 2 | Status: SHIPPED | OUTPATIENT
Start: 2019-12-26 | End: 2020-03-25

## 2020-03-01 DIAGNOSIS — I10 ESSENTIAL HYPERTENSION: ICD-10-CM

## 2020-03-02 RX ORDER — LISINOPRIL 20 MG/1
TABLET ORAL
Qty: 30 TABLET | Refills: 3 | Status: SHIPPED | OUTPATIENT
Start: 2020-03-02 | End: 2020-07-09

## 2020-03-25 DIAGNOSIS — F32.89 OTHER DEPRESSION: ICD-10-CM

## 2020-03-25 RX ORDER — DULOXETIN HYDROCHLORIDE 60 MG/1
CAPSULE, DELAYED RELEASE ORAL
Qty: 30 CAPSULE | Refills: 1 | Status: SHIPPED | OUTPATIENT
Start: 2020-03-25 | End: 2020-05-26

## 2020-05-26 DIAGNOSIS — F32.89 OTHER DEPRESSION: ICD-10-CM

## 2020-05-26 RX ORDER — DULOXETIN HYDROCHLORIDE 60 MG/1
CAPSULE, DELAYED RELEASE ORAL
Qty: 30 CAPSULE | Refills: 3 | Status: SHIPPED | OUTPATIENT
Start: 2020-05-26 | End: 2020-06-15

## 2020-06-10 RX ORDER — CARVEDILOL 12.5 MG/1
TABLET ORAL
Qty: 60 TABLET | Refills: 4 | Status: SHIPPED | OUTPATIENT
Start: 2020-06-10 | End: 2021-03-31

## 2020-06-15 ENCOUNTER — E-VISIT (OUTPATIENT)
Dept: INTERNAL MEDICINE | Facility: CLINIC | Age: 55
End: 2020-06-15

## 2020-06-15 DIAGNOSIS — N30.00 ACUTE CYSTITIS WITHOUT HEMATURIA: Primary | ICD-10-CM

## 2020-06-15 PROCEDURE — 99421 OL DIG E/M SVC 5-10 MIN: CPT | Performed by: INTERNAL MEDICINE

## 2020-06-15 RX ORDER — NITROFURANTOIN 25; 75 MG/1; MG/1
100 CAPSULE ORAL 2 TIMES DAILY
Qty: 10 CAPSULE | Refills: 0 | OUTPATIENT
Start: 2020-06-15 | End: 2020-09-08

## 2020-06-15 NOTE — PROGRESS NOTES
Demetria MONSON Jacob    1965  [id number]    I have reviewed the e-Visit questionnaire and patient's answers, my assessment and plan are as follows:    HPI    Patient c/o urinary symptoms of frequency/incomplete emptying and lower abdominal pains.  Has hx of UTI.  No fever or chills or rigors.  No hematuria.  No recent antibiotics or urinary instrumentation or surgery    Review of Systems - General ROS: negative for - chills or fatigue  Genito-Urinary ROS: positive for - dysuria, pelvic pain and urinary frequency/urgency        Diagnoses and all orders for this visit:    Acute cystitis without hematuria  -     nitrofurantoin, macrocrystal-monohydrate, (Macrobid) 100 MG capsule; Take 1 capsule by mouth 2 (Two) Times a Day.        Any medications prescribed have been sent electronically to   [facility] [address] [place] [address] - [address] [place] [address] AT [facility] [employer] [facility] [employer] [address] - [phone] PH - [phone] [address] [address]  Phone: [phone] Fax: [phone]        Yossi Auguste MD  06/15/20  12:43 PM

## 2020-07-09 DIAGNOSIS — I10 ESSENTIAL HYPERTENSION: ICD-10-CM

## 2020-07-09 RX ORDER — LISINOPRIL 20 MG/1
TABLET ORAL
Qty: 30 TABLET | Refills: 2 | Status: SHIPPED | OUTPATIENT
Start: 2020-07-09 | End: 2020-10-12

## 2020-09-24 RX ORDER — DULOXETIN HYDROCHLORIDE 60 MG/1
CAPSULE, DELAYED RELEASE ORAL
Qty: 30 CAPSULE | Refills: 2 | Status: SHIPPED | OUTPATIENT
Start: 2020-09-24 | End: 2020-12-24

## 2020-10-11 DIAGNOSIS — I10 ESSENTIAL HYPERTENSION: ICD-10-CM

## 2020-10-12 RX ORDER — LISINOPRIL 20 MG/1
TABLET ORAL
Qty: 90 TABLET | Refills: 1 | Status: SHIPPED | OUTPATIENT
Start: 2020-10-12 | End: 2021-04-26

## 2020-12-24 RX ORDER — DULOXETIN HYDROCHLORIDE 60 MG/1
CAPSULE, DELAYED RELEASE ORAL
Qty: 90 CAPSULE | Refills: 3 | Status: SHIPPED | OUTPATIENT
Start: 2020-12-24 | End: 2021-12-23

## 2021-03-15 ENCOUNTER — TRANSCRIBE ORDERS (OUTPATIENT)
Dept: ADMINISTRATIVE | Facility: HOSPITAL | Age: 56
End: 2021-03-15

## 2021-03-15 ENCOUNTER — HOSPITAL ENCOUNTER (OUTPATIENT)
Dept: GENERAL RADIOLOGY | Facility: HOSPITAL | Age: 56
Discharge: HOME | End: 2021-03-15
Admitting: NURSE PRACTITIONER

## 2021-03-15 DIAGNOSIS — M25.551 RIGHT HIP PAIN: ICD-10-CM

## 2021-03-15 DIAGNOSIS — M25.551 RIGHT HIP PAIN: Primary | ICD-10-CM

## 2021-03-15 PROCEDURE — 73502 X-RAY EXAM HIP UNI 2-3 VIEWS: CPT

## 2021-03-31 RX ORDER — CARVEDILOL 12.5 MG/1
TABLET ORAL
Qty: 180 TABLET | Refills: 3 | Status: SHIPPED | OUTPATIENT
Start: 2021-03-31 | End: 2022-04-28

## 2021-04-26 DIAGNOSIS — I10 ESSENTIAL HYPERTENSION: ICD-10-CM

## 2021-04-26 RX ORDER — LISINOPRIL 20 MG/1
TABLET ORAL
Qty: 90 TABLET | Refills: 3 | Status: SHIPPED | OUTPATIENT
Start: 2021-04-26 | End: 2021-11-09

## 2021-06-07 ENCOUNTER — OFFICE VISIT (OUTPATIENT)
Dept: ORTHOPEDIC SURGERY | Facility: CLINIC | Age: 56
End: 2021-06-07

## 2021-06-07 VITALS
HEART RATE: 91 BPM | BODY MASS INDEX: 17.36 KG/M2 | WEIGHT: 108 LBS | SYSTOLIC BLOOD PRESSURE: 155 MMHG | HEIGHT: 66 IN | DIASTOLIC BLOOD PRESSURE: 98 MMHG

## 2021-06-07 DIAGNOSIS — M25.551 RIGHT HIP PAIN: Primary | ICD-10-CM

## 2021-06-07 PROCEDURE — 99204 OFFICE O/P NEW MOD 45 MIN: CPT | Performed by: ORTHOPAEDIC SURGERY

## 2021-06-07 RX ORDER — GABAPENTIN 400 MG/1
CAPSULE ORAL
COMMUNITY
End: 2022-10-07

## 2021-06-07 NOTE — PROGRESS NOTES
[facility] Note    Subjective     Chief Complaint   Patient presents with   • Right Hip - Pain        HPI    Demetria James is a 55 y.o. female who presents with new problem of right hip pain.     She denies any groin pain. Her pain is constant and it is inhibiting her life. Her pain has worsened since 03/2021, and she has days where she is unable to ambulate. She does have numbness in her toes secondary to neuropathy.    I have reviewed the following portions of the patient's history and agree with: History of Present Illness and Review of Systems    Patient Active Problem List   Diagnosis   • Depression   • Hypertension   • Arthralgia of temporomandibular joint   • Primary insomnia   • Pain in multiple finger joints   • Atypical mole     Past Medical History:   Diagnosis Date   • Hypertension    • Neuropathy       Past Surgical History:   Procedure Laterality Date   • CARPAL TUNNEL RELEASE        Family History   Problem Relation Age of Onset   • Heart disease Mother    • Hypertension Mother    • Hypertension Father    • Diabetes Sister    • Hypertension Sister    • Diabetes Brother    • Hypertension Brother      Social History     Socioeconomic History   • Marital status:      Spouse name: Not on file   • Number of children: Not on file   • Years of education: Not on file   • Highest education level: Not on file   Tobacco Use   • Smoking status: Current Every Day Smoker     Packs/day: 1.00     Types: Cigarettes     Start date: 1980   • Smokeless tobacco: Never Used   Vaping Use   • Vaping Use: Never used   Substance and Sexual Activity   • Alcohol use: No   • Drug use: No   • Sexual activity: Defer      Current Outpatient Medications on File Prior to Visit   Medication Sig Dispense Refill   • carvedilol (COREG) 12.5 MG tablet TAKE ONE TABLET BY MOUTH TWICE A  tablet 3   • DULoxetine (CYMBALTA) 60 MG capsule TAKE ONE CAPSULE BY MOUTH DAILY 90 capsule 3   • gabapentin  "(NEURONTIN) 400 MG capsule gabapentin 400 mg capsule     • HYDROcodone-acetaminophen (NORCO) 7.5-325 MG per tablet Take 1 tablet by mouth Every 6 (Six) Hours As Needed for Moderate Pain .     • lisinopril (PRINIVIL,ZESTRIL) 20 MG tablet TAKE ONE TABLET BY MOUTH DAILY 90 tablet 3     No current facility-administered medications on file prior to visit.      Allergies   Allergen Reactions   • Cephalexin GI Intolerance        Review of Systems   Constitutional: Positive for activity change and fatigue.   HENT: Negative.    Eyes: Negative.    Respiratory: Negative.    Cardiovascular: Negative.    Gastrointestinal: Negative.    Endocrine: Negative.    Genitourinary: Negative.    Musculoskeletal: Positive for arthralgias, back pain, gait problem and neck pain.   Skin: Negative.    Allergic/Immunologic: Positive for environmental allergies.   Neurological: Positive for weakness and numbness.   Hematological: Negative.    Psychiatric/Behavioral: Positive for decreased concentration and sleep disturbance. The patient is nervous/anxious.         Objective      Physical Exam  /98   Pulse 91   Ht 167.6 cm (65.98\")   Wt 49 kg (108 lb)   BMI 17.44 kg/m²     Body mass index is 17.44 kg/m².    General:   Mental Status:  Alert   Appearance: Cooperative, in no acute distress   Build and Nutrition: Thin female   Orientation: Alert and oriented to person, place and time   Posture: Normal   Gait: Normal    Integument  • Right hip: No skin lesions, rash, or ecchymosis.    Neurologic  • Sensation:  • Right foot: Intact to light touch on the dorsal and plantar aspects. No current numbness in her toes.    Motor  • Right lower extremity: 5/5 quadriceps, hamstrings, ankle dorsiflexors, and ankle plantar flexors    Vascular  • Right lower extremity: 2+ dorsalis pedis pulse. Prompt capillary refill.    Lower Extremities  • Right Hip:  • Tenderness: Just inferior the iliac crest. She localizes her pain to the lateral aspect of her right " hip with pain around the superior aspect of the iliac crest.   • Swelling: None  • Crepitus: None  • Atrophy: None  • Range of motion:  • External rotation: 40°  • Internal rotation: 40°  • Flexion: 100°  • Extension: 0°  • Instability: None  • Deformities: None  • Functional Testing:  • Stinchfield Test: Positive  • Slightly short on the right lower extremity    Imaging/Studies      Imaging Results (Last 24 Hours)     ** No results found for the last 24 hours. **          Assessment and Plan     Diagnoses and all orders for this visit:    1. Right hip pain (Primary)  -     XR Hip With or Without Pelvis 2 - 3 View Right  -     MRI Hip Right Without Contrast; Future        1. Right hip pain        I have reviewed my findings with the patient.  Because of her persistent pain and exam findings, we will obtain an MRI of the right hip for further evaluation, which has been placed. I have advised her to avoid strenuous activity.    Return for After Imaging Study.      Scribed for Patrick Mercedes MD by Micah Bourgeois  06/08/21   08:35 EDT    I have personally performed the services described in this document as scribed by the above individual, and it is both accurate and complete.  Patrick Mercedes MD  6/8/2021  10:01 EDT

## 2021-07-02 ENCOUNTER — HOSPITAL ENCOUNTER (OUTPATIENT)
Dept: MRI IMAGING | Facility: HOSPITAL | Age: 56
Discharge: HOME | End: 2021-07-02
Admitting: ORTHOPAEDIC SURGERY

## 2021-07-02 DIAGNOSIS — M25.551 RIGHT HIP PAIN: ICD-10-CM

## 2021-07-02 PROCEDURE — 73721 MRI JNT OF LWR EXTRE W/O DYE: CPT

## 2021-07-12 ENCOUNTER — OFFICE VISIT (OUTPATIENT)
Dept: ORTHOPEDIC SURGERY | Facility: CLINIC | Age: 56
End: 2021-07-12

## 2021-07-12 VITALS — SYSTOLIC BLOOD PRESSURE: 146 MMHG | HEIGHT: 66 IN | BODY MASS INDEX: 17.44 KG/M2 | DIASTOLIC BLOOD PRESSURE: 93 MMHG

## 2021-07-12 DIAGNOSIS — M16.11 OSTEOARTHRITIS OF RIGHT HIP, UNSPECIFIED OSTEOARTHRITIS TYPE: Primary | ICD-10-CM

## 2021-07-12 PROCEDURE — 99214 OFFICE O/P EST MOD 30 MIN: CPT | Performed by: ORTHOPAEDIC SURGERY

## 2021-07-12 NOTE — PROGRESS NOTES
[facility]    Subjective     Chief Complaint   Patient presents with   • Follow-up     1 month MRI f/u--Right hip pain         HPI    Demetria James is a 55 y.o. female who follows up for her right hip MRI. At this point, the patient's right hip pain has been going on for approximately 6 months. The pain is anterior and wraps around to the posterior side of her right hip. She does have numbness and tingling in both feet, but she does note this could be attributed to separate issues she has with her feet. Her right hip pain is worse with walking, standing, sitting, climbing stairs, and rising from a seated position. She denies any falls or trauma. The pain is getting worse. She states that she was previously a very active person, but this pain has become debilitating. She rates the right hip pain as an 8 out of 10 currently, which she attributes to keeping her 1-year-old twin granddaughters over the weekend. She takes Aleve, which offers relief but does not completely resolve the pain. She has not done any therapy for her hip. Dr. Putnam with [facility] did perform an injection into the right hip joint with x-ray guidance about 4 months ago, which she states offered relief of her pain for 5 days. She believe the injection included steroid.    I have reviewed the following portions of the patient's history and agree with: History of Present Illness and Review of Systems    Patient Active Problem List   Diagnosis   • Depression   • Hypertension   • Arthralgia of temporomandibular joint   • Primary insomnia   • Pain in multiple finger joints   • Atypical mole     Past Medical History:   Diagnosis Date   • Hypertension    • Neuropathy       Past Surgical History:   Procedure Laterality Date   • CARPAL TUNNEL RELEASE        Family History   Problem Relation Age of Onset   • Heart disease Mother    • Hypertension Mother    • Hypertension Father    • Diabetes Sister    •  Hypertension Sister    • Diabetes Brother    • Hypertension Brother      Social History     Socioeconomic History   • Marital status:      Spouse name: Not on file   • Number of children: Not on file   • Years of education: Not on file   • Highest education level: Not on file   Tobacco Use   • Smoking status: Current Every Day Smoker     Packs/day: 1.00     Types: Cigarettes     Start date: 1980   • Smokeless tobacco: Never Used   Vaping Use   • Vaping Use: Never used   Substance and Sexual Activity   • Alcohol use: No   • Drug use: No   • Sexual activity: Defer      Current Outpatient Medications on File Prior to Visit   Medication Sig Dispense Refill   • carvedilol (COREG) 12.5 MG tablet TAKE ONE TABLET BY MOUTH TWICE A  tablet 3   • DULoxetine (CYMBALTA) 60 MG capsule TAKE ONE CAPSULE BY MOUTH DAILY 90 capsule 3   • gabapentin (NEURONTIN) 400 MG capsule gabapentin 400 mg capsule     • HYDROcodone-acetaminophen (NORCO) 7.5-325 MG per tablet Take 1 tablet by mouth Every 6 (Six) Hours As Needed for Moderate Pain .     • lisinopril (PRINIVIL,ZESTRIL) 20 MG tablet TAKE ONE TABLET BY MOUTH DAILY 90 tablet 3     No current facility-administered medications on file prior to visit.      Allergies   Allergen Reactions   • Cephalexin GI Intolerance        Review of Systems   Constitutional: Negative for activity change, appetite change, chills, diaphoresis, fatigue, fever and unexpected weight change.   HENT: Negative for congestion, dental problem, drooling, ear discharge, ear pain, facial swelling, hearing loss, mouth sores, nosebleeds, postnasal drip, rhinorrhea, sinus pressure, sneezing, sore throat, tinnitus, trouble swallowing and voice change.    Eyes: Negative for photophobia, pain, discharge, redness, itching and visual disturbance.   Respiratory: Negative for apnea, cough, choking, chest tightness, shortness of breath, wheezing and stridor.    Cardiovascular: Negative for chest pain, palpitations and  "leg swelling.   Gastrointestinal: Negative for abdominal distention, abdominal pain, anal bleeding, blood in stool, constipation, diarrhea, nausea, rectal pain and vomiting.   Endocrine: Negative for cold intolerance, heat intolerance, polydipsia, polyphagia and polyuria.   Genitourinary: Negative for decreased urine volume, difficulty urinating, dysuria, enuresis, flank pain, frequency, genital sores, hematuria and urgency.   Musculoskeletal: Positive for arthralgias. Negative for back pain, gait problem, joint swelling, myalgias, neck pain and neck stiffness.   Skin: Negative for color change, pallor, rash and wound.   Allergic/Immunologic: Negative for environmental allergies, food allergies and immunocompromised state.   Neurological: Negative for dizziness, tremors, seizures, syncope, facial asymmetry, speech difficulty, weakness, light-headedness, numbness and headaches.   Hematological: Negative for adenopathy. Does not bruise/bleed easily.   Psychiatric/Behavioral: Negative for agitation, behavioral problems, confusion, decreased concentration, dysphoric mood, hallucinations, self-injury, sleep disturbance and suicidal ideas. The patient is not nervous/anxious and is not hyperactive.         Objective      Physical Exam  /93   Ht 167.6 cm (65.98\")   BMI 17.44 kg/m²     Body mass index is 17.44 kg/m².    General:   Mental Status:  Alert   Appearance: Cooperative, in no acute distress   Build and Nutrition: Thin female   Orientation: Alert and oriented to person, place and time   Posture: Normal   Gait: Mildly antalgic on the right    Integument  • Right hip: No skin lesions, rash, or ecchymosis.    Lower Extremities  • Right Hip:  • Tenderness: Some lateral tenderness  • Swelling: None  • Crepitus: None  • Range of motion:  • External rotation: 30°  • Internal rotation: 30°  • Flexion: 100°  • Extension: 0°  • Deformities: None  • Functional Testing:  • Stinchfield Test: Positive  • No leg length " discrepancy.    Imaging/Studies  Imaging Results (Last 24 Hours)     ** No results found for the last 24 hours. **        EXAMINATION: MRI HIP RIGHT WO CONTRAST - 07/02/2021     INDICATION: M25.551-Pain in right hip. Evaluate for AVN.     TECHNIQUE: Multiplanar MRI of the hip without intervenous contrast.     COMPARISON: Hip x-ray 06/07/2021.     FINDINGS: Intrapelvic soft tissues without focal fluid collection or  bulky pelvic adenopathy. No superficial inguinal adenopathy. No focal  fluid collection or focal inflammatory process of the pelvic girdle soft  tissues which are grossly symmetric in  overall appearance.     Osseous structures reveal SI joints symmetric and without widening. No  displaced pelvic ring fracture. Right hip has mild to moderate DJD along  with cystic degeneration present and radial tearing of the right  acetabular labrum superior portion without hyaline cartilage interface  extension. No acute fracture.     IMPRESSION:  No evidence for vascular necrosis with mild to moderate DJD  the right hip including cystic degeneration and radial tearing of the  right acetabular labrum without hyaline cartilage interface extension.     DICTATED:   07/02/2021  EDITED/ls :   07/02/2021         This report was finalized on 7/6/2021 4:48 PM by Dr. Hansel Delgado.    Assessment and Plan     Diagnoses and all orders for this visit:    1. Osteoarthritis of right hip, unspecified osteoarthritis type (Primary)  -     External Facility Surgical/Procedural Request; Future        1. Osteoarthritis of right hip, unspecified osteoarthritis type        We discussed her right hip MRI, which demonstrated a small cyst in the bone consistent with degeneration. I do not see any bursitis or tendinitis. We discussed her treatment options, which include a different anti-inflammatory, a trial of an intra-articular hip injection under x-ray guidance, or a total hip arthroplasty. She did respond well to an intra-articular hip  injection with another provider 4 months ago. Given her age and the degree of findings on her MRI, it seems reasonable to postpone surgery for as long as we are able and proceed with a repeat right hip intra-articular injection for both diagnostic and therapeutic purposes. She is agreeable to this plan, and we will get her scheduled for this today. I informed her that she can not have surgery for 3 months after the injection.    Return in about 4 weeks (around 8/9/2021) for After Hip Injection.      Scribed for Patrick Mercedes MD by Margarette Alegre.  07/12/21   10:06 EDT    I have personally performed the services described in this document as scribed by the above individual, and it is both accurate and complete.  Patrick Mercedes MD  7/12/2021  12:15 EDT

## 2021-08-13 ENCOUNTER — OUTSIDE FACILITY SERVICE (OUTPATIENT)
Dept: ORTHOPEDIC SURGERY | Facility: CLINIC | Age: 56
End: 2021-08-13

## 2021-08-13 PROCEDURE — 77002 NEEDLE LOCALIZATION BY XRAY: CPT | Performed by: ORTHOPAEDIC SURGERY

## 2021-08-13 PROCEDURE — 20610 DRAIN/INJ JOINT/BURSA W/O US: CPT | Performed by: ORTHOPAEDIC SURGERY

## 2021-08-25 ENCOUNTER — E-VISIT (OUTPATIENT)
Dept: INTERNAL MEDICINE | Facility: CLINIC | Age: 56
End: 2021-08-25

## 2021-08-25 DIAGNOSIS — J01.80 ACUTE NON-RECURRENT SINUSITIS OF OTHER SINUS: Primary | ICD-10-CM

## 2021-08-25 PROCEDURE — 99421 OL DIG E/M SVC 5-10 MIN: CPT | Performed by: INTERNAL MEDICINE

## 2021-08-25 RX ORDER — AZITHROMYCIN 250 MG/1
TABLET, FILM COATED ORAL
Qty: 6 TABLET | Refills: 0 | Status: SHIPPED | OUTPATIENT
Start: 2021-08-25 | End: 2021-11-09

## 2021-08-25 NOTE — PROGRESS NOTES
Demetria IONA James    1965  [id number]    I have reviewed the e-Visit questionnaire and patient's answers, my assessment and plan are as follows:    HPI- Patient c/o 5-7 day hx of sore throat/swollen glands in his neck/sinus pressure.  No fever.  Has smoking history.  Some better with sinus rinses and Nasacort.  Has had a negative Covid test last 8/23    Review of Systems - General ROS: negative for - fever  ENT ROS: positive for - nasal congestion and sore throat        There are no diagnoses linked to this encounter.    Any medications prescribed have been sent electronically to   [facility] [address] [place] [address] - [address] [place] [address] AT [facility] [address] - [phone] PH - [phone] [address] [address]  Phone: [phone] Fax: [phone]        Yossi Auguste MD  08/25/21  16:17 EDT

## 2021-09-13 ENCOUNTER — OFFICE VISIT (OUTPATIENT)
Dept: ORTHOPEDIC SURGERY | Facility: CLINIC | Age: 56
End: 2021-09-13

## 2021-09-13 VITALS
WEIGHT: 108 LBS | BODY MASS INDEX: 17.36 KG/M2 | HEIGHT: 66 IN | SYSTOLIC BLOOD PRESSURE: 162 MMHG | DIASTOLIC BLOOD PRESSURE: 103 MMHG | HEART RATE: 91 BPM

## 2021-09-13 DIAGNOSIS — M16.11 OSTEOARTHRITIS OF RIGHT HIP, UNSPECIFIED OSTEOARTHRITIS TYPE: Primary | ICD-10-CM

## 2021-09-13 PROCEDURE — 99213 OFFICE O/P EST LOW 20 MIN: CPT | Performed by: ORTHOPAEDIC SURGERY

## 2021-09-13 RX ORDER — CYCLOBENZAPRINE HCL 10 MG
TABLET ORAL
COMMUNITY
Start: 2021-08-27 | End: 2022-09-02 | Stop reason: SDUPTHER

## 2021-09-13 NOTE — PROGRESS NOTES
"    [facility]    Subjective     Chief Complaint   Patient presents with   • Follow-up     2 week f/u after right intra-articular hip injection 8/31/21        HPI    Demetria James is a 56 y.o. female who follows up after her right hip intra-articular injection on 08/31/2021. Upon walking from the operating room to the recovery room that day, the patient reported 80% relief of her right hip pain. Of note, she has had a prior intra-articular hip injection with another provider that provided relief.    Today, the patient states that the injection on 08/31/2021 provided 50% relief of her right hip pain later that day. However, her pain returned in full the day after the injection. She states that it feels like someone is \" shoving a knife \" into the anterior aspect of her right hip when she is walking. She denies any numbness or tingling in the leg.    The patient states that she has a lot of back problems, including scoliosis and numbness in her feet. She has seen Dr. Putnam with [facility], where she is receiving injections for her back. She states that these injections are helpful, and her most recent injection relieved all of her pain, including her hip pain. This relief lasted 5 days.    I have reviewed the following portions of the patient's history and agree with: History of Present Illness and Review of Systems    Patient Active Problem List   Diagnosis   • Depression   • Hypertension   • Arthralgia of temporomandibular joint   • Primary insomnia   • Pain in multiple finger joints   • Atypical mole     Past Medical History:   Diagnosis Date   • Hypertension    • Neuropathy       Past Surgical History:   Procedure Laterality Date   • CARPAL TUNNEL RELEASE        Family History   Problem Relation Age of Onset   • Heart disease Mother    • Hypertension Mother    • Hypertension Father    • Diabetes Sister    • Hypertension Sister    • Diabetes Brother    • Hypertension Brother " "     Social History     Socioeconomic History   • Marital status:      Spouse name: Not on file   • Number of children: Not on file   • Years of education: Not on file   • Highest education level: Not on file   Tobacco Use   • Smoking status: Current Every Day Smoker     Packs/day: 1.00     Types: Cigarettes     Start date: 1980   • Smokeless tobacco: Never Used   Vaping Use   • Vaping Use: Never used   Substance and Sexual Activity   • Alcohol use: No   • Drug use: No   • Sexual activity: Defer      Current Outpatient Medications on File Prior to Visit   Medication Sig Dispense Refill   • carvedilol (COREG) 12.5 MG tablet TAKE ONE TABLET BY MOUTH TWICE A  tablet 3   • cyclobenzaprine (FLEXERIL) 10 MG tablet      • DULoxetine (CYMBALTA) 60 MG capsule TAKE ONE CAPSULE BY MOUTH DAILY 90 capsule 3   • gabapentin (NEURONTIN) 400 MG capsule gabapentin 400 mg capsule     • HYDROcodone-acetaminophen (NORCO) 7.5-325 MG per tablet Take 1 tablet by mouth Every 6 (Six) Hours As Needed for Moderate Pain .     • lisinopril (PRINIVIL,ZESTRIL) 20 MG tablet TAKE ONE TABLET BY MOUTH DAILY 90 tablet 3   • azithromycin (Zithromax Z-Jason) 250 MG tablet Take 2 tablets the first day, then 1 tablet daily for 4 days. 6 tablet 0     No current facility-administered medications on file prior to visit.      Allergies   Allergen Reactions   • Cephalexin GI Intolerance        Review of Systems   Constitutional: Negative.    HENT: Negative.    Eyes: Negative.    Respiratory: Negative.    Cardiovascular: Negative.    Gastrointestinal: Negative.    Endocrine: Negative.    Genitourinary: Negative.    Musculoskeletal: Positive for arthralgias.   Skin: Negative.    Allergic/Immunologic: Negative.    Neurological: Negative.    Hematological: Negative.    Psychiatric/Behavioral: Negative.         Objective      Physical Exam  BP (!) 162/103   Pulse 91   Ht 167.6 cm (65.98\")   Wt 49 kg (108 lb)   BMI 17.44 kg/m²     Body mass index is " 17.44 kg/m².    General:   Mental Status:  Alert   Appearance: Cooperative, in no acute distress   Build and Nutrition: Thin female   Orientation: Alert and oriented to person, place and time   Posture: Normal   Gait: Mildly antalgic on the right    Integument  • Right hip: No skin lesions, rash, or ecchymosis.    Lower Extremities  • Right Hip:  • Tenderness: None  • Swelling: None  • Crepitus: None  • Range of motion:     • External rotation: 40°     • Internal rotation: 30° with groin pain     • Flexion: 100°     • Extension: 0°  • Deformities: None  • Functional Testing:  • Stinchfield Test: Positive  • Slightly short on the right as compared to the left.    Imaging/Studies  Imaging Results (Last 24 Hours)     Procedure Component Value Units Date/Time    XR Hip With or Without Pelvis 2 - 3 View Right [[id number]] Resulted: 09/13/21 0858     Updated: 09/13/21 0859    Narrative:      Right Hip Radiographs  Indication: right hip pain  Views: low AP pelvis and lateral of the right hip    Comparison: 6/7/2021    Findings:   No change compared to previous imaging, with similar appearance of the   femoral head, consistent with degenerative changes, as well as   calcification along the inferior portion of the femoral neck, unchanged   from to previous images from 6/7/2021 and 3/15/2021.  No unusual bony   features.            Assessment and Plan     Diagnoses and all orders for this visit:    1. Osteoarthritis of right hip, unspecified osteoarthritis type (Primary)  -     XR Hip With or Without Pelvis 2 - 3 View Right        1. Osteoarthritis of right hip, unspecified osteoarthritis type        We discussed her right hip x-rays, which demonstrated no change compared to her previous x-rays in 03/2021 and 06/2021. She does have some arthritis in the right hip, but she is not bone-on-bone. Given her imaging, the transient relief she had with the right hip-intra-articular injection, and her report of spine issues and  improvement of her hip pain with a spine injection, I am not entirely confident that a right total hip arthroplasty would relieve her pain.  She is fairly frustrated with her hip, and is desiring relief.  I offered her a referral for a second opinion.  She wished to proceed, and I will refer her to Dr. Mayo for that opinion.    Return for Second opinion with Dr. Mayo.      Transcribed from ambient dictation for Patrick Mercedes MD by Margarette Alegre.  09/13/21   10:05 EDT    I have personally performed the services described in this document as transcribed by the above individual, and it is both accurate and complete.  Patrick Mercedes MD  9/13/2021  12:08 EDT

## 2021-11-09 ENCOUNTER — HOSPITAL ENCOUNTER (OUTPATIENT)
Dept: GENERAL RADIOLOGY | Facility: HOSPITAL | Age: 56
Discharge: HOME | End: 2021-11-09
Admitting: INTERNAL MEDICINE

## 2021-11-09 ENCOUNTER — OFFICE VISIT (OUTPATIENT)
Dept: INTERNAL MEDICINE | Facility: CLINIC | Age: 56
End: 2021-11-09

## 2021-11-09 VITALS
SYSTOLIC BLOOD PRESSURE: 140 MMHG | TEMPERATURE: 96.9 F | HEART RATE: 98 BPM | BODY MASS INDEX: 18.48 KG/M2 | OXYGEN SATURATION: 96 % | DIASTOLIC BLOOD PRESSURE: 90 MMHG | WEIGHT: 115 LBS | HEIGHT: 66 IN

## 2021-11-09 DIAGNOSIS — J40 BRONCHITIS: ICD-10-CM

## 2021-11-09 DIAGNOSIS — F32.89 OTHER DEPRESSION: ICD-10-CM

## 2021-11-09 DIAGNOSIS — I10 PRIMARY HYPERTENSION: Primary | ICD-10-CM

## 2021-11-09 PROCEDURE — 71046 X-RAY EXAM CHEST 2 VIEWS: CPT

## 2021-11-09 PROCEDURE — 99214 OFFICE O/P EST MOD 30 MIN: CPT | Performed by: INTERNAL MEDICINE

## 2021-11-09 RX ORDER — DOXYCYCLINE HYCLATE 100 MG/1
100 CAPSULE ORAL 2 TIMES DAILY
Qty: 20 CAPSULE | Refills: 0 | OUTPATIENT
Start: 2021-11-09 | End: 2022-05-11

## 2021-11-09 RX ORDER — LISINOPRIL AND HYDROCHLOROTHIAZIDE 20; 12.5 MG/1; MG/1
1 TABLET ORAL EVERY MORNING
Qty: 90 TABLET | Refills: 3 | Status: SHIPPED | OUTPATIENT
Start: 2021-11-09 | End: 2022-05-26 | Stop reason: SDUPTHER

## 2021-11-09 NOTE — PROGRESS NOTES
Patient is a 56 y.o. female who is here for cough.  Chief Complaint   Patient presents with   • Cough         HPI:    Patient c/o 1 week hx of cough and congestion.  No fever.  Some anterior pleuritic pain.  Some palpitations.  Some dizziness.  Some decrease hearing from Right ear.  No chills.  Feels SOB.  No recent antibiotics.  Had covid test last Sunday and neg.  Followed by pain mgmt.      History:     Patient Active Problem List   Diagnosis   • Depression   • Hypertension   • Arthralgia of temporomandibular joint   • Primary insomnia   • Pain in multiple finger joints   • Atypical mole   • Bronchitis       Past Medical History:   Diagnosis Date   • Hypertension    • Neuropathy        Past Surgical History:   Procedure Laterality Date   • CARPAL TUNNEL RELEASE         Current Outpatient Medications on File Prior to Visit   Medication Sig   • carvedilol (COREG) 12.5 MG tablet TAKE ONE TABLET BY MOUTH TWICE A DAY   • cyclobenzaprine (FLEXERIL) 10 MG tablet    • DULoxetine (CYMBALTA) 60 MG capsule TAKE ONE CAPSULE BY MOUTH DAILY   • gabapentin (NEURONTIN) 400 MG capsule gabapentin 400 mg capsule   • HYDROcodone-acetaminophen (NORCO) 7.5-325 MG per tablet Take 1 tablet by mouth Every 6 (Six) Hours As Needed for Moderate Pain .   • [DISCONTINUED] lisinopril (PRINIVIL,ZESTRIL) 20 MG tablet TAKE ONE TABLET BY MOUTH DAILY   • [DISCONTINUED] azithromycin (Zithromax Z-Jason) 250 MG tablet Take 2 tablets the first day, then 1 tablet daily for 4 days.     No current facility-administered medications on file prior to visit.       Family History   Problem Relation Age of Onset   • Heart disease Mother    • Hypertension Mother    • Hypertension Father    • Diabetes Sister    • Hypertension Sister    • Diabetes Brother    • Hypertension Brother        Social History     Socioeconomic History   • Marital status:    Tobacco Use   • Smoking status: Current Every Day Smoker     Packs/day: 1.00     Types: Cigarettes     Start  "date: 1980   • Smokeless tobacco: Never Used   Vaping Use   • Vaping Use: Never used   Substance and Sexual Activity   • Alcohol use: No   • Drug use: No   • Sexual activity: Defer         Review of Systems   Constitutional: Positive for fatigue. Negative for chills, fever and unexpected weight change.   HENT: Negative for congestion, ear pain, hearing loss, rhinorrhea, sinus pressure, sore throat and trouble swallowing.    Eyes: Negative for discharge and itching.   Respiratory: Positive for cough and shortness of breath. Negative for chest tightness.    Cardiovascular: Negative for chest pain, palpitations and leg swelling.   Gastrointestinal: Negative for abdominal pain, blood in stool, constipation, diarrhea and vomiting.   Endocrine: Negative for polydipsia and polyuria.   Genitourinary: Negative for difficulty urinating, dysuria, enuresis, frequency, hematuria and urgency.   Musculoskeletal: Negative for arthralgias, back pain, gait problem and joint swelling.   Skin: Negative for rash and wound.   Allergic/Immunologic: Negative for immunocompromised state.   Neurological: Negative for dizziness, syncope, weakness, light-headedness, numbness and headaches.   Hematological: Does not bruise/bleed easily.   Psychiatric/Behavioral: Negative for behavioral problems, dysphoric mood and sleep disturbance. The patient is not nervous/anxious.        /90   Pulse 98   Temp 96.9 °F (36.1 °C) (Infrared)   Ht 167.6 cm (65.98\")   Wt 52.2 kg (115 lb)   SpO2 96%   BMI 18.57 kg/m²       Physical Exam  Constitutional:       Appearance: Normal appearance. She is well-developed.   HENT:      Head: Normocephalic and atraumatic.      Right Ear: External ear normal.      Left Ear: External ear normal.      Nose: Nose normal.      Mouth/Throat:      Mouth: Mucous membranes are moist.      Pharynx: Oropharynx is clear.   Eyes:      Extraocular Movements: Extraocular movements intact.      Conjunctiva/sclera: Conjunctivae " normal.      Pupils: Pupils are equal, round, and reactive to light.   Cardiovascular:      Rate and Rhythm: Normal rate and regular rhythm.      Heart sounds: Normal heart sounds.   Pulmonary:      Effort: Pulmonary effort is normal.      Breath sounds: Wheezing present.      Comments: Diminished BS  Abdominal:      General: Bowel sounds are normal.      Palpations: Abdomen is soft.   Musculoskeletal:         General: Normal range of motion.      Cervical back: Normal range of motion and neck supple.   Lymphadenopathy:      Cervical: No cervical adenopathy.   Skin:     General: Skin is warm and dry.   Neurological:      General: No focal deficit present.      Mental Status: She is alert and oriented to person, place, and time.   Psychiatric:         Mood and Affect: Mood normal.         Behavior: Behavior normal.         Thought Content: Thought content normal.         Procedure:      Discussion/Summary:    Htn/palpitations-cont coreg, change to zestoretic  depression-cont duloxetine  LE paresthesia-cont gabapentin and duloxetine  Pain in multiple joints-followed by Dr Putnam  Bronchitis-rx doxy, and check cxr  Other-labs today and advised mammogram and colonoscopy    Advised Vit D 2000 IU qd    Current Outpatient Medications:   •  carvedilol (COREG) 12.5 MG tablet, TAKE ONE TABLET BY MOUTH TWICE A DAY, Disp: 180 tablet, Rfl: 3  •  cyclobenzaprine (FLEXERIL) 10 MG tablet, , Disp: , Rfl:   •  DULoxetine (CYMBALTA) 60 MG capsule, TAKE ONE CAPSULE BY MOUTH DAILY, Disp: 90 capsule, Rfl: 3  •  gabapentin (NEURONTIN) 400 MG capsule, gabapentin 400 mg capsule, Disp: , Rfl:   •  HYDROcodone-acetaminophen (NORCO) 7.5-325 MG per tablet, Take 1 tablet by mouth Every 6 (Six) Hours As Needed for Moderate Pain ., Disp: , Rfl:   •  doxycycline (VIBRAMYCIN) 100 MG capsule, Take 1 capsule by mouth 2 (Two) Times a Day., Disp: 20 capsule, Rfl: 0  •  lisinopril-hydrochlorothiazide (Zestoretic) 20-12.5 MG per tablet, Take 1 tablet by  mouth Every Morning., Disp: 90 tablet, Rfl: 3        Diagnoses and all orders for this visit:    1. Primary hypertension (Primary)  -     lisinopril-hydrochlorothiazide (Zestoretic) 20-12.5 MG per tablet; Take 1 tablet by mouth Every Morning.  Dispense: 90 tablet; Refill: 3    2. Other depression    3. Bronchitis  -     doxycycline (VIBRAMYCIN) 100 MG capsule; Take 1 capsule by mouth 2 (Two) Times a Day.  Dispense: 20 capsule; Refill: 0  -     XR Chest PA & Lateral

## 2021-11-10 ENCOUNTER — TELEPHONE (OUTPATIENT)
Dept: INTERNAL MEDICINE | Facility: CLINIC | Age: 56
End: 2021-11-10

## 2021-11-10 NOTE — TELEPHONE ENCOUNTER
Caller: Demetria James    Relationship: Self    Best call back number: [phone] (H)    What medication are you requesting: COUGH MEDICATION     What are your current symptoms: CHEST CONGESTION AND COUGHING     How long have you been experiencing symptoms: ON WEEK     Have you had these symptoms before:    [x] Yes  [] No    Have you been treated for these symptoms before:   [x] Yes  [] No    If a prescription is needed, what is your preferred pharmacy and phone number: [facility] [address] AT [facility] [address] - [phone]  - [phone] FX     Additional notes: PATIENT WAS SEEN YESTERDAY REGARDING THIS

## 2021-11-18 ENCOUNTER — TELEPHONE (OUTPATIENT)
Dept: INTERNAL MEDICINE | Facility: CLINIC | Age: 56
End: 2021-11-18

## 2021-11-18 RX ORDER — METHYLPREDNISOLONE 4 MG/1
TABLET ORAL
Qty: 1 EACH | Refills: 0 | OUTPATIENT
Start: 2021-11-18 | End: 2022-05-11

## 2021-11-18 RX ORDER — ALBUTEROL SULFATE 90 UG/1
2 AEROSOL, METERED RESPIRATORY (INHALATION) EVERY 6 HOURS PRN
Qty: 18 G | Refills: 2 | Status: SHIPPED | OUTPATIENT
Start: 2021-11-18 | End: 2023-02-27 | Stop reason: SDUPTHER

## 2021-11-18 NOTE — TELEPHONE ENCOUNTER
Caller: Demetria James    Relationship: Self    Best call back number:[phone] (H)    What medication are you requesting: DOES NOT KNOW     What are your current symptoms: WHEEZING, CHEST CONGESTION, COUGHING     How long have you been experiencing symptoms: OVER A WEEK     Have you had these symptoms before:    [x] Yes  [] No    Have you been treated for these symptoms before:   [x] Yes  [] No    If a prescription is needed, what is your preferred pharmacy and phone number: [facility] [address] [facility] [address] - [phone]  - [phone]     Additional notes: PATIENT WAS TREATED LAST WEEK AND WAS TOLD SHE HAD BRONCHITIS- PATIENT STATED SHE IS NOT GETTING ANY BETTER- SHE WILL FINISH HER ANTIBIOTICS TODAY WANTS TO KNOW WHAT TO DO     Called and questions answered

## 2021-11-30 ENCOUNTER — TELEPHONE (OUTPATIENT)
Dept: INTERNAL MEDICINE | Facility: CLINIC | Age: 56
End: 2021-11-30

## 2021-11-30 DIAGNOSIS — Z72.0 TOBACCO ABUSE: ICD-10-CM

## 2021-11-30 DIAGNOSIS — R04.2 COUGH WITH HEMOPTYSIS: Primary | ICD-10-CM

## 2021-11-30 NOTE — TELEPHONE ENCOUNTER
Pt is still coughinh, she said sometimes she coughs up blood, but not often. She wants to know what she should do,please advise.

## 2021-12-02 ENCOUNTER — TELEPHONE (OUTPATIENT)
Dept: INTERNAL MEDICINE | Facility: CLINIC | Age: 56
End: 2021-12-02

## 2021-12-02 NOTE — TELEPHONE ENCOUNTER
Caller: Demetria James    Relationship: Self    Best call back number: [phone]    What orders are you requesting (i.e. lab or imaging): CT SCAN    In what timeframe would the patient need to come in: ASAP BEFORE END OF YEAR    Where will you receive your lab/imaging services:    Additional notes: REQUESTED BY DR LONG

## 2021-12-03 NOTE — TELEPHONE ENCOUNTER
PATIENT IS GOING TO NEED A PRIOR AUTHORIZATION FOR CT SCAN. PATIENT IS SCHEDULED 12/13/21 AT 8:45. PLEASE RETURN CALL [phone]

## 2021-12-10 ENCOUNTER — TELEPHONE (OUTPATIENT)
Dept: INTERNAL MEDICINE | Facility: CLINIC | Age: 56
End: 2021-12-10

## 2021-12-15 ENCOUNTER — TELEPHONE (OUTPATIENT)
Dept: INTERNAL MEDICINE | Facility: CLINIC | Age: 56
End: 2021-12-15

## 2021-12-15 RX ORDER — AMOXICILLIN AND CLAVULANATE POTASSIUM 875; 125 MG/1; MG/1
1 TABLET, FILM COATED ORAL 2 TIMES DAILY
Qty: 20 TABLET | Refills: 0 | OUTPATIENT
Start: 2021-12-15 | End: 2022-05-11

## 2021-12-23 RX ORDER — DULOXETIN HYDROCHLORIDE 60 MG/1
CAPSULE, DELAYED RELEASE ORAL
Qty: 30 CAPSULE | Refills: 6 | Status: SHIPPED | OUTPATIENT
Start: 2021-12-23 | End: 2022-07-22

## 2022-04-28 RX ORDER — CARVEDILOL 12.5 MG/1
TABLET ORAL
Qty: 60 TABLET | Refills: 6 | Status: SHIPPED | OUTPATIENT
Start: 2022-04-28 | End: 2023-01-27

## 2022-05-22 DIAGNOSIS — I10 ESSENTIAL HYPERTENSION: ICD-10-CM

## 2022-05-23 RX ORDER — LISINOPRIL 20 MG/1
TABLET ORAL
Qty: 30 TABLET | OUTPATIENT
Start: 2022-05-23

## 2022-05-26 ENCOUNTER — TELEPHONE (OUTPATIENT)
Dept: INTERNAL MEDICINE | Facility: CLINIC | Age: 57
End: 2022-05-26

## 2022-05-26 DIAGNOSIS — I10 PRIMARY HYPERTENSION: ICD-10-CM

## 2022-05-26 RX ORDER — LISINOPRIL AND HYDROCHLOROTHIAZIDE 20; 12.5 MG/1; MG/1
1 TABLET ORAL EVERY MORNING
Qty: 90 TABLET | Refills: 3 | Status: SHIPPED | OUTPATIENT
Start: 2022-05-26 | End: 2022-08-19

## 2022-06-20 ENCOUNTER — TELEPHONE (OUTPATIENT)
Dept: INTERNAL MEDICINE | Facility: CLINIC | Age: 57
End: 2022-06-20

## 2022-06-20 NOTE — TELEPHONE ENCOUNTER
She said she has been having a little trouble urinating  Since last week (has to sit on toilet for awhile before she can)  She is stating that today she has not been able to pee at all and is having side pain

## 2022-06-20 NOTE — TELEPHONE ENCOUNTER
Pt states she is unable to use the restroom and doesn't know why, I made her an appt for tomorrow with merle russo that was the first opening ,but she wanted to see if she could get in earlier,please advise.

## 2022-07-15 ENCOUNTER — LAB (OUTPATIENT)
Dept: LAB | Facility: HOSPITAL | Age: 57
End: 2022-07-15

## 2022-07-15 ENCOUNTER — OFFICE VISIT (OUTPATIENT)
Dept: FAMILY MEDICINE CLINIC | Facility: CLINIC | Age: 57
End: 2022-07-15

## 2022-07-15 VITALS
SYSTOLIC BLOOD PRESSURE: 82 MMHG | DIASTOLIC BLOOD PRESSURE: 62 MMHG | HEART RATE: 104 BPM | TEMPERATURE: 98 F | WEIGHT: 100.8 LBS | OXYGEN SATURATION: 95 % | BODY MASS INDEX: 16.79 KG/M2 | HEIGHT: 65 IN

## 2022-07-15 DIAGNOSIS — R53.83 FATIGUE, UNSPECIFIED TYPE: ICD-10-CM

## 2022-07-15 DIAGNOSIS — H65.02 NON-RECURRENT ACUTE SEROUS OTITIS MEDIA OF LEFT EAR: ICD-10-CM

## 2022-07-15 DIAGNOSIS — R63.4 WEIGHT LOSS, UNINTENTIONAL: ICD-10-CM

## 2022-07-15 DIAGNOSIS — R22.1 NECK NODULE: ICD-10-CM

## 2022-07-15 DIAGNOSIS — I95.9 HYPOTENSION, UNSPECIFIED HYPOTENSION TYPE: Primary | ICD-10-CM

## 2022-07-15 PROCEDURE — 99214 OFFICE O/P EST MOD 30 MIN: CPT | Performed by: PHYSICIAN ASSISTANT

## 2022-07-15 PROCEDURE — 84439 ASSAY OF FREE THYROXINE: CPT

## 2022-07-15 PROCEDURE — 80050 GENERAL HEALTH PANEL: CPT

## 2022-07-15 PROCEDURE — 82607 VITAMIN B-12: CPT

## 2022-07-15 PROCEDURE — 82306 VITAMIN D 25 HYDROXY: CPT

## 2022-07-15 RX ORDER — AMOXICILLIN 500 MG/1
500 CAPSULE ORAL 2 TIMES DAILY
Qty: 20 CAPSULE | Refills: 0 | Status: SHIPPED | OUTPATIENT
Start: 2022-07-15 | End: 2022-07-25

## 2022-07-15 RX ORDER — METHYLPREDNISOLONE 4 MG/1
TABLET ORAL
Qty: 21 TABLET | Refills: 0 | Status: SHIPPED | OUTPATIENT
Start: 2022-07-15 | End: 2022-08-19

## 2022-07-15 NOTE — PROGRESS NOTES
New Patient Office Visit      Date: 07/15/2022   Patient Name: Demetria James  : 1965   MRN: [id number]     Chief Complaint:    Chief Complaint   Patient presents with   • Cough   • Earache       History of Present Illness: Demetria James is a 56 y.o. female who is here today to establish care.  Has sore throat , hard to swallow past 7 days.  Has lost 20 lbs in the last year, knots in neck for a year, has COPD.  She just has not really felt well in the past couple months.  She has had a cough for the past week or so as well.  No fever.  No nausea/vomiting/diarrhea chest pain or shortness of breath.    Subjective      Review of Systems:   Review of Systems   Constitutional: Positive for fatigue and unexpected weight loss. Negative for fever.   HENT: Positive for congestion, ear pain, swollen glands and trouble swallowing. Negative for drooling and ear discharge.    Respiratory: Positive for cough, shortness of breath and stridor.    Gastrointestinal: Negative for abdominal pain, diarrhea and vomiting.   Musculoskeletal: Positive for neck pain.   Neurological: Negative for dizziness and headache.        Past Medical History:   Past Medical History:   Diagnosis Date   • Hypertension    • Kidney stone    • Low back pain    • Neuropathy    • Scoliosis 2016       Past Surgical History:   Past Surgical History:   Procedure Laterality Date   • CARPAL TUNNEL RELEASE     • CORONARY STENT PLACEMENT         Family History:   Family History   Problem Relation Age of Onset   • Heart disease Mother    • Hypertension Mother    • Diabetes Mother    • Hypertension Father    • COPD Father    • Hyperlipidemia Father    • Diabetes Sister    • Hypertension Sister    • Diabetes Brother    • Hypertension Brother        Social History:   Social History     Socioeconomic History   • Marital status:    Tobacco Use   • Smoking status: Current Every Day Smoker     Packs/day: 1.00     Years: 30.00     Pack  "years: 30.00     Types: Cigarettes, Cigarettes     Start date: 1/1/1980   • Smokeless tobacco: Never Used   Vaping Use   • Vaping Use: Never used   Substance and Sexual Activity   • Alcohol use: No   • Drug use: No   • Sexual activity: Yes     Partners: Male     Birth control/protection: Other     Comment: Menopause       Medications:     Current Outpatient Medications:   •  albuterol sulfate  (90 Base) MCG/ACT inhaler, Inhale 2 puffs Every 6 (Six) Hours As Needed for Wheezing or Shortness of Air., Disp: 18 g, Rfl: 2  •  carvedilol (COREG) 12.5 MG tablet, TAKE ONE TABLET BY MOUTH TWICE A DAY, Disp: 60 tablet, Rfl: 6  •  cyclobenzaprine (FLEXERIL) 10 MG tablet, , Disp: , Rfl:   •  doxycycline (MONODOX) 100 MG capsule, Take 1 capsule by mouth 2 (Two) Times a Day., Disp: 20 capsule, Rfl: 0  •  DULoxetine (CYMBALTA) 60 MG capsule, TAKE ONE CAPSULE BY MOUTH DAILY, Disp: 30 capsule, Rfl: 6  •  gabapentin (NEURONTIN) 400 MG capsule, gabapentin 400 mg capsule, Disp: , Rfl:   •  HYDROcod Polst-CPM Polst ER (Tussionex Pennkinetic ER) 10-8 MG/5ML ER suspension, Take 5 mL by mouth Every 12 (Twelve) Hours As Needed for Cough., Disp: 120 mL, Rfl: 0  •  HYDROcodone-acetaminophen (NORCO) 7.5-325 MG per tablet, Take 1 tablet by mouth Every 6 (Six) Hours As Needed for Moderate Pain ., Disp: , Rfl:   •  lisinopril-hydrochlorothiazide (Zestoretic) 20-12.5 MG per tablet, Take 1 tablet by mouth Every Morning., Disp: 90 tablet, Rfl: 3  •  amoxicillin (AMOXIL) 500 MG capsule, Take 1 capsule by mouth 2 (Two) Times a Day for 10 days., Disp: 20 capsule, Rfl: 0  •  methylPREDNISolone (MEDROL) 4 MG dose pack, Take as directed on package instructions., Disp: 21 tablet, Rfl: 0    Allergies:   Allergies   Allergen Reactions   • Cephalexin GI Intolerance       Objective     Vital Signs:   Vitals:    07/15/22 1459   BP: (!) 82/62   Pulse: 104   Temp: 98 °F (36.7 °C)   SpO2: 95%   Weight: 45.7 kg (100 lb 12.8 oz)   Height: 165.1 cm (65\") "   PainSc: 0-No pain     Body mass index is 16.77 kg/m².   BMI is below normal parameters (malnutrition). Recommendations: treating the underlying disease process      Physical Exam:   Physical Exam  Vitals and nursing note reviewed.   Constitutional:       General: She is not in acute distress.     Appearance: She is well-groomed and underweight.   HENT:      Head: Normocephalic and atraumatic.      Right Ear: Tympanic membrane and ear canal normal. There is no impacted cerumen.      Left Ear: Ear canal normal. A middle ear effusion is present. There is no impacted cerumen. Tympanic membrane is not erythematous.      Nose: Nose normal. No congestion or rhinorrhea.      Mouth/Throat:      Mouth: Mucous membranes are moist.      Pharynx: Oropharynx is clear. No oropharyngeal exudate or posterior oropharyngeal erythema.   Eyes:      General: No scleral icterus.        Right eye: No discharge.         Left eye: No discharge.      Extraocular Movements: Extraocular movements intact.      Conjunctiva/sclera: Conjunctivae normal.      Pupils: Pupils are equal, round, and reactive to light.   Neck:      Thyroid: No thyromegaly.      Vascular: No carotid bruit.   Cardiovascular:      Rate and Rhythm: Normal rate and regular rhythm.      Heart sounds: Normal heart sounds. No murmur heard.  Pulmonary:      Breath sounds: Normal breath sounds. No wheezing, rhonchi or rales.   Abdominal:      General: Bowel sounds are normal. There is no distension.      Palpations: Abdomen is soft. There is no mass.      Tenderness: There is no abdominal tenderness.   Musculoskeletal:         General: No swelling. Normal range of motion.      Cervical back: Normal range of motion and neck supple.      Right lower leg: No edema.      Left lower leg: No edema.   Lymphadenopathy:      Cervical: Cervical adenopathy present.      Right cervical: No superficial cervical adenopathy.     Left cervical: Superficial cervical adenopathy and deep cervical  adenopathy present.   Skin:     General: Skin is warm.      Coloration: Skin is not jaundiced or pale.      Findings: No bruising or rash.   Neurological:      General: No focal deficit present.      Mental Status: She is alert.      Cranial Nerves: No cranial nerve deficit.      Motor: No weakness.      Gait: Gait normal.   Psychiatric:         Mood and Affect: Mood normal.         Behavior: Behavior normal.         Judgment: Judgment normal.            Assessment / Plan      Assessment/Plan:   Diagnoses and all orders for this visit:    1. Hypotension, unspecified hypotension type (Primary)    2. Weight loss, unintentional  -     CBC (No Diff); Future  -     Comprehensive Metabolic Panel; Future  -     TSH; Future  -     T4, free; Future  -     CT Chest With Contrast; Future  -     CT Abdomen Pelvis With Contrast; Future  -     CT soft tissue neck w contrast; Future    3. Neck nodule    4. Fatigue, unspecified type  -     Vitamin D 25 Hydroxy; Future  -     Vitamin B12; Future    5. Non-recurrent acute serous otitis media of left ear  -     amoxicillin (AMOXIL) 500 MG capsule; Take 1 capsule by mouth 2 (Two) Times a Day for 10 days.  Dispense: 20 capsule; Refill: 0  -     methylPREDNISolone (MEDROL) 4 MG dose pack; Take as directed on package instructions.  Dispense: 21 tablet; Refill: 0         1. Her blood pressure was initially quite low today.  Recheck revealed this to improved to 98/70.  She denies any dizziness or other symptoms.  We will obtain labs, I will arrange for CT of neck chest and abdomen pelvis for weight loss and neck nodule.  For the serous otitis of the left ear amoxicillin and Medrol Dosepak as directed.  Await results.      Follow Up:   No follow-ups on file.    Shweta Hawkins PA-C   [facility] [place]     Answers for HPI/ROS submitted by the patient on 7/14/2022  What is the primary reason for your visit?: Sore Throat  Chronicity: recurrent  Onset: in the past 7 days  Progression  since onset: rapidly worsening  Pain worse on: left  Fever: no fever  Pain - numeric: 4/10  headaches: Yes  hoarse voice: Yes  plugged ear sensation: Yes  strep: No  mono: No

## 2022-07-16 LAB
25(OH)D3 SERPL-MCNC: 16.4 NG/ML (ref 30–100)
ALBUMIN SERPL-MCNC: 4.3 G/DL (ref 3.5–5.2)
ALBUMIN/GLOB SERPL: 1.3 G/DL
ALP SERPL-CCNC: 111 U/L (ref 39–117)
ALT SERPL W P-5'-P-CCNC: 9 U/L (ref 1–33)
ANION GAP SERPL CALCULATED.3IONS-SCNC: 14 MMOL/L (ref 5–15)
AST SERPL-CCNC: 18 U/L (ref 1–32)
BILIRUB SERPL-MCNC: 0.5 MG/DL (ref 0–1.2)
BUN SERPL-MCNC: 14 MG/DL (ref 6–20)
BUN/CREAT SERPL: 13.2 (ref 7–25)
CALCIUM SPEC-SCNC: 10.1 MG/DL (ref 8.6–10.5)
CHLORIDE SERPL-SCNC: 97 MMOL/L (ref 98–107)
CO2 SERPL-SCNC: 27 MMOL/L (ref 22–29)
CREAT SERPL-MCNC: 1.06 MG/DL (ref 0.57–1)
DEPRECATED RDW RBC AUTO: 46.3 FL (ref 37–54)
EGFRCR SERPLBLD CKD-EPI 2021: 61.4 ML/MIN/1.73
ERYTHROCYTE [DISTWIDTH] IN BLOOD BY AUTOMATED COUNT: 13.7 % (ref 12.3–15.4)
GLOBULIN UR ELPH-MCNC: 3.4 GM/DL
GLUCOSE SERPL-MCNC: 61 MG/DL (ref 65–99)
HCT VFR BLD AUTO: 39.8 % (ref 34–46.6)
HGB BLD-MCNC: 13.3 G/DL (ref 12–15.9)
MCH RBC QN AUTO: 31.4 PG (ref 26.6–33)
MCHC RBC AUTO-ENTMCNC: 33.4 G/DL (ref 31.5–35.7)
MCV RBC AUTO: 93.9 FL (ref 79–97)
PLATELET # BLD AUTO: 261 10*3/MM3 (ref 140–450)
PMV BLD AUTO: 9.6 FL (ref 6–12)
POTASSIUM SERPL-SCNC: 4.7 MMOL/L (ref 3.5–5.2)
PROT SERPL-MCNC: 7.7 G/DL (ref 6–8.5)
RBC # BLD AUTO: 4.24 10*6/MM3 (ref 3.77–5.28)
SODIUM SERPL-SCNC: 138 MMOL/L (ref 136–145)
T4 FREE SERPL-MCNC: 0.91 NG/DL (ref 0.93–1.7)
TSH SERPL DL<=0.05 MIU/L-ACNC: 1.67 UIU/ML (ref 0.27–4.2)
VIT B12 BLD-MCNC: 347 PG/ML (ref 211–946)
WBC NRBC COR # BLD: 13.34 10*3/MM3 (ref 3.4–10.8)

## 2022-07-20 PROBLEM — M51.369 DEGENERATION OF LUMBAR INTERVERTEBRAL DISC: Status: ACTIVE | Noted: 2019-07-24

## 2022-07-20 PROBLEM — M25.551 PAIN OF RIGHT HIP JOINT: Status: ACTIVE | Noted: 2021-01-04

## 2022-07-20 PROBLEM — Z79.4 ENCOUNTER FOR LONG-TERM (CURRENT) USE OF INSULIN (HCC): Status: ACTIVE | Noted: 2019-07-24

## 2022-07-20 PROBLEM — M53.3 SACROILIAC JOINT PAIN: Status: ACTIVE | Noted: 2017-07-20

## 2022-07-20 PROBLEM — G89.4 CHRONIC PAIN DISORDER: Status: ACTIVE | Noted: 2019-07-24

## 2022-07-20 PROBLEM — M47.816 LUMBAR SPONDYLOSIS: Status: ACTIVE | Noted: 2019-07-24

## 2022-07-20 PROBLEM — M51.36 DEGENERATION OF LUMBAR INTERVERTEBRAL DISC: Status: ACTIVE | Noted: 2019-07-24

## 2022-07-20 PROBLEM — M47.817 LUMBOSACRAL SPONDYLOSIS WITHOUT MYELOPATHY: Status: ACTIVE | Noted: 2018-06-13

## 2022-07-20 PROBLEM — M70.61 TROCHANTERIC BURSITIS OF RIGHT HIP: Status: ACTIVE | Noted: 2021-12-27

## 2022-07-20 PROBLEM — F41.9 ANXIETY: Status: ACTIVE | Noted: 2018-03-14

## 2022-07-20 PROBLEM — G60.9 IDIOPATHIC PERIPHERAL NEUROPATHY: Status: ACTIVE | Noted: 2019-07-24

## 2022-07-20 RX ORDER — ONDANSETRON 4 MG/1
1 TABLET, ORALLY DISINTEGRATING ORAL 3 TIMES DAILY PRN
COMMUNITY
End: 2022-08-19

## 2022-07-22 RX ORDER — DULOXETIN HYDROCHLORIDE 60 MG/1
CAPSULE, DELAYED RELEASE ORAL
Qty: 30 CAPSULE | Refills: 6 | Status: SHIPPED | OUTPATIENT
Start: 2022-07-22 | End: 2023-02-17

## 2022-07-25 ENCOUNTER — HOSPITAL ENCOUNTER (OUTPATIENT)
Dept: CT IMAGING | Facility: HOSPITAL | Age: 57
Discharge: HOME | End: 2022-07-25
Admitting: PHYSICIAN ASSISTANT

## 2022-07-25 DIAGNOSIS — R63.4 WEIGHT LOSS, UNINTENTIONAL: ICD-10-CM

## 2022-07-25 PROCEDURE — 70491 CT SOFT TISSUE NECK W/DYE: CPT

## 2022-07-25 PROCEDURE — 74177 CT ABD & PELVIS W/CONTRAST: CPT

## 2022-07-25 PROCEDURE — 25010000002 IOPAMIDOL 61 % SOLUTION: Performed by: PHYSICIAN ASSISTANT

## 2022-07-25 PROCEDURE — 71260 CT THORAX DX C+: CPT

## 2022-07-25 RX ADMIN — IOPAMIDOL 95 ML: 612 INJECTION, SOLUTION INTRAVENOUS at 16:54

## 2022-07-29 DIAGNOSIS — E04.9 THYROID GOITER: Primary | ICD-10-CM

## 2022-07-29 DIAGNOSIS — R91.8 ABNORMAL CT SCAN OF LUNG: ICD-10-CM

## 2022-08-19 ENCOUNTER — OFFICE VISIT (OUTPATIENT)
Dept: PULMONOLOGY | Facility: CLINIC | Age: 57
End: 2022-08-19

## 2022-08-19 VITALS
WEIGHT: 105 LBS | HEIGHT: 64 IN | TEMPERATURE: 97.5 F | SYSTOLIC BLOOD PRESSURE: 140 MMHG | BODY MASS INDEX: 17.93 KG/M2 | OXYGEN SATURATION: 98 % | HEART RATE: 83 BPM | DIASTOLIC BLOOD PRESSURE: 90 MMHG

## 2022-08-19 DIAGNOSIS — R06.02 SOB (SHORTNESS OF BREATH): Primary | ICD-10-CM

## 2022-08-19 DIAGNOSIS — F17.218 CIGARETTE NICOTINE DEPENDENCE WITH OTHER NICOTINE-INDUCED DISORDER: ICD-10-CM

## 2022-08-19 DIAGNOSIS — J43.2 CENTRILOBULAR EMPHYSEMA: ICD-10-CM

## 2022-08-19 DIAGNOSIS — J44.9 COPD, MODERATE: ICD-10-CM

## 2022-08-19 DIAGNOSIS — R91.1 LUNG NODULE: ICD-10-CM

## 2022-08-19 DIAGNOSIS — R63.4 WEIGHT LOSS: ICD-10-CM

## 2022-08-19 PROCEDURE — 94060 EVALUATION OF WHEEZING: CPT | Performed by: INTERNAL MEDICINE

## 2022-08-19 PROCEDURE — 94729 DIFFUSING CAPACITY: CPT | Performed by: INTERNAL MEDICINE

## 2022-08-19 PROCEDURE — 94726 PLETHYSMOGRAPHY LUNG VOLUMES: CPT | Performed by: INTERNAL MEDICINE

## 2022-08-19 PROCEDURE — 99204 OFFICE O/P NEW MOD 45 MIN: CPT | Performed by: INTERNAL MEDICINE

## 2022-08-19 RX ORDER — ALBUTEROL SULFATE 90 UG/1
4 AEROSOL, METERED RESPIRATORY (INHALATION) ONCE
Status: COMPLETED | OUTPATIENT
Start: 2022-08-19 | End: 2022-08-19

## 2022-08-19 RX ORDER — TIOTROPIUM BROMIDE INHALATION SPRAY 3.12 UG/1
2 SPRAY, METERED RESPIRATORY (INHALATION)
Qty: 1 EACH | Refills: 6 | Status: SHIPPED | OUTPATIENT
Start: 2022-08-19

## 2022-08-19 RX ADMIN — ALBUTEROL SULFATE 4 PUFF: 90 AEROSOL, METERED RESPIRATORY (INHALATION) at 09:26

## 2022-08-19 NOTE — PROGRESS NOTES
New Pulmonary Patient Office Visit      Patient Name: Demetria James    Referring Physician: Shweta Hawkins PA    Chief Complaint:    Chief Complaint   Patient presents with   • Abnormal CT scan of lung       History of Present Illness: Demetria James is a 56 y.o. female who is here today to establish care with Pulmonary.      56-year-old female with past medical history of back pain, hypertension presenting for initial evaluation.  Patient is chronic smoker with 35-pack-year smoking history.  Patient tells me that she has had 2 rounds of bronchitis in the last 4 months.  She did have initial chest x-ray which did not show any significant abnormality and subsequently she had a chest CT scan which showed right middle lobe nodular opacity  and she is referred here for further evaluation.  Patient states that she has been feeling tired for last 1 year.  She also has had unexplained weight loss of 20 pounds in the same amount of time.  States that she eats well.  She is also going to see endocrinology for multinodular goiter.  She also complaining of left upper quadrant abdominal discomfort which has been constant and going on for another year as well.  Abdominal CT did not reveal any other pathology.  She does get short of breath easily with activity.  She does have intermittent cough but denies any hemoptysis.  No history of COVID infection.  No fevers, chills or night sweats.  No hospitalization due to respiratory issues.  She denies any hematochezia or melena.  Denies any difficulty swallowing.  Having left-sided frontal headaches from time to time that has been going on for some time as well.  Denies any other neurological weakness.    Patient is currently smoking a pack a day.  No other secondhand smoke exposure.  Denies any alcohol use or illicit drug use.    Patient states that she generally does not undergo any age-appropriate malignancy  screening such as mammograms, Pap smears or colonoscopy  either.    She has albuterol inhaler which she uses sparingly.    Patient denies any occupational or environmental exposures.  Denies any black mold exposure.  No pets or birds exposure.  She works for a [employer] firm and part-time at [employer] as well.    Subjective      Review of Systems:   Review of Systems   Constitutional: Positive for fatigue and unexpected weight loss.   HENT: Positive for ear pain, hearing loss, sore throat and trouble swallowing.    Respiratory: Positive for shortness of breath.    Cardiovascular: Positive for chest pain.   Gastrointestinal: Negative.    Endocrine: Negative.    Musculoskeletal: Positive for arthralgias, back pain, neck pain and neck stiffness.   Skin: Negative.    Neurological: Positive for light-headedness and headache.   Hematological: Negative.    Psychiatric/Behavioral: Positive for sleep disturbance. The patient is nervous/anxious.    All other systems reviewed and are negative.      Past Medical History:   Past Medical History:   Diagnosis Date   • Hypertension    • Kidney stone 2015   • Low back pain 2012   • Neuropathy    • Scoliosis 2016       Past Surgical History:   Past Surgical History:   Procedure Laterality Date   • CARPAL TUNNEL RELEASE     • CORONARY STENT PLACEMENT  2018       Family History:   Family History   Problem Relation Age of Onset   • Heart disease Mother    • Hypertension Mother    • Diabetes Mother    • Hypertension Father    • COPD Father    • Hyperlipidemia Father    • Diabetes Sister    • Hypertension Sister    • Diabetes Brother    • Hypertension Brother        Social History:   Social History     Socioeconomic History   • Marital status:    Tobacco Use   • Smoking status: Current Every Day Smoker     Packs/day: 1.00     Years: 30.00     Pack years: 30.00     Types: Cigarettes, Cigarettes     Start date: 1/1/1980   • Smokeless tobacco: Never Used   Vaping Use   • Vaping Use: Never used   Substance and Sexual Activity   • Alcohol use: No   •  "Drug use: No   • Sexual activity: Yes     Partners: Male     Birth control/protection: Other     Comment: Menopause       Medications:     Current Outpatient Medications:   •  albuterol sulfate  (90 Base) MCG/ACT inhaler, Inhale 2 puffs Every 6 (Six) Hours As Needed for Wheezing or Shortness of Air., Disp: 18 g, Rfl: 2  •  carvedilol (COREG) 12.5 MG tablet, TAKE ONE TABLET BY MOUTH TWICE A DAY, Disp: 60 tablet, Rfl: 6  •  cyclobenzaprine (FLEXERIL) 10 MG tablet, , Disp: , Rfl:   •  DULoxetine (CYMBALTA) 60 MG capsule, TAKE ONE CAPSULE BY MOUTH DAILY, Disp: 30 capsule, Rfl: 6  •  gabapentin (NEURONTIN) 400 MG capsule, gabapentin 400 mg capsule, Disp: , Rfl:   •  HYDROcodone-acetaminophen (NORCO) 7.5-325 MG per tablet, Take 1 tablet by mouth Every 6 (Six) Hours As Needed for Moderate Pain ., Disp: , Rfl:   •  tiotropium bromide monohydrate (Spiriva Respimat) 2.5 MCG/ACT aerosol solution inhaler, Inhale 2 puffs Daily., Disp: 1 each, Rfl: 6  No current facility-administered medications for this visit.    Allergies:   Allergies   Allergen Reactions   • Cephalexin GI Intolerance       Objective     Physical Exam:  Vital Signs:   Vitals:    08/19/22 0908   BP: 140/90   BP Location: Left arm   Patient Position: Sitting   Cuff Size: Large Adult   Pulse: 83   Temp: 97.5 °F (36.4 °C)   TempSrc: Infrared   SpO2: 98%  Comment: room air, at rest   Weight: 47.6 kg (105 lb)   Height: 162.6 cm (64\")       Physical Exam  Vitals and nursing note reviewed.   Constitutional:       General: She is not in acute distress.     Appearance: She is well-developed. She is not diaphoretic.   HENT:      Head: Normocephalic and atraumatic.      Comments: No thrush noted     Nose: Nose normal.      Mouth/Throat:      Pharynx: No oropharyngeal exudate.   Eyes:      General: No scleral icterus.        Right eye: No discharge.         Left eye: No discharge.      Pupils: Pupils are equal, round, and reactive to light.   Neck:      Thyroid: No " thyromegaly.      Trachea: No tracheal deviation.   Cardiovascular:      Rate and Rhythm: Normal rate and regular rhythm.      Heart sounds: Normal heart sounds. No murmur heard.    No friction rub. No gallop.   Pulmonary:      Effort: Pulmonary effort is normal. No respiratory distress.      Breath sounds: No stridor. No wheezing or rales.      Comments: Prolonged exp phase  Abdominal:      General: There is no distension.      Palpations: Abdomen is soft.      Tenderness: There is no abdominal tenderness.   Musculoskeletal:         General: No tenderness.      Cervical back: Neck supple.      Right lower leg: No edema.      Left lower leg: No edema.      Comments: Clubbing: none   Lymphadenopathy:      Cervical: No cervical adenopathy.   Neurological:      Mental Status: She is alert and oriented to person, place, and time.      Cranial Nerves: No cranial nerve deficit.      Coordination: Coordination normal.   Psychiatric:         Behavior: Behavior normal.         Thought Content: Thought content normal.         Judgment: Judgment normal.         Results Review:   I reviewed the patient's new clinical results.    Chest CT scan reviewed in detail with the patient as well.  Patient does have bilateral upper lobe centrilobular emphysematous changes.  No significant adenopathy noted.  No pleural effusion noted.  Right middle lobe base is showing nodular opacity which looks more like a scar but underlying malignancy cannot be ruled out.  No previous studies available for comparison.    PFT IMPRESSION:   1. Available data suggests moderate obstruction.    2. No significant bronchodilator response, but this does not preclude their clinical use.  3. Lung volume reveals normal.       4. Airway resistance is elevated.  5. DLCO is mildly reduced and could be suggestive of emphysema, interstitial lung disease, significant anemia, Pulmonary vascular disease etc. Clinical correlation will be required.      Assessment / Plan       Assessment:   Problem List Items Addressed This Visit    None     Visit Diagnoses     SOB (shortness of breath)    -  Primary    Relevant Medications    albuterol sulfate HFA (PROVENTIL HFA;VENTOLIN HFA;PROAIR HFA) inhaler 4 puff (Completed)    Other Relevant Orders    Pulmonary Function Test (Completed)    Lung nodule        Relevant Orders    NM pet skull base to mid thigh    COPD, moderate (HCC)        Relevant Medications    tiotropium bromide monohydrate (Spiriva Respimat) 2.5 MCG/ACT aerosol solution inhaler    Centrilobular emphysema (HCC)        Relevant Medications    tiotropium bromide monohydrate (Spiriva Respimat) 2.5 MCG/ACT aerosol solution inhaler    Weight loss        Cigarette nicotine dependence with other nicotine-induced disorder              Plan:   1.  Patient presenting with abnormal chest CT scan and having frequent bronchitis.  Denies any recent pneumonia.  Also having unexplained weight loss in the patient with chronic smoking malignancy is in the differential.  Discussed with patient that we could either go with biopsy of this lesion or get a PET scan to see if these lesions are active versus short-term follow-up.  Discussed the rationale for each approach.  After our discussion we decided to proceed with PET scan to take another look at these lesions and if PET scan is positive then we will go ahead with either biopsy or surgical resection.  If PET scan is negative then will proceed with short-term follow-up in 3 months and keep a close eye on it for at least a period of 2 years and then she will still need annual CT scans given her smoking history for lung cancer screening.  She is comfortable with our discussion and agreeable to proceed with PET scan which we will arrange and follow-up after.    2.  PFTs reviewed and patient has moderate COPD and emphysema.  Given her ongoing symptoms I will recommend we start on long-acting muscarinic antagonist once a day.  I did provide her  sample of Spiriva Respimat 2.5 mcg dose.  Correct technique of using the inhaler reviewed with her.  She can use albuterol as needed as well.  If has frequent bronchitis and flareups then will institute inhaled steroids.  Prescription sent to [facility] pharmacy.  Side effects of medication reviewed.    3.  Follow-up with endocrinology to see if thyroid disease is playing a role in her unexpected weight loss.  Age-appropriate malignancy work-up will also be recommended including colonoscopy, Pap smear and mammogram.    4.  Smoking cessation counseled extensively.  Patient understands the importance of that and will work on that aspect.    I will follow her after PET scan and will arrange for further plan accordingly.  Patient had no further questions at this point.  Thank you for allowing me to participate in the care of this patient.    Follow Up:   After PET scan    Discussed plan of care in detail with patient today. Patient verbally understands and agrees.    Enrique Hopkins MD  Pulmonary Critical Care and Sleep Medicine

## 2022-09-01 ENCOUNTER — DOCUMENTATION (OUTPATIENT)
Dept: PULMONOLOGY | Facility: CLINIC | Age: 57
End: 2022-09-01

## 2022-09-01 DIAGNOSIS — J44.9 CHRONIC OBSTRUCTIVE PULMONARY DISEASE, UNSPECIFIED COPD TYPE: ICD-10-CM

## 2022-09-01 DIAGNOSIS — J40 BRONCHITIS: Primary | ICD-10-CM

## 2022-09-01 NOTE — PROGRESS NOTES
** PA approved through 9/1/23    PA for Incruse sent to plan, awaiting determination. Key: [id number]

## 2022-09-02 PROCEDURE — U0004 COV-19 TEST NON-CDC HGH THRU: HCPCS | Performed by: NURSE PRACTITIONER

## 2022-09-09 ENCOUNTER — HOSPITAL ENCOUNTER (OUTPATIENT)
Dept: PET IMAGING | Facility: HOSPITAL | Age: 57
Discharge: HOME | End: 2022-09-09

## 2022-09-09 DIAGNOSIS — R91.1 LUNG NODULE: ICD-10-CM

## 2022-09-09 LAB — GLUCOSE BLDC GLUCOMTR-MCNC: 87 MG/DL (ref 70–130)

## 2022-09-09 PROCEDURE — 82962 GLUCOSE BLOOD TEST: CPT

## 2022-09-09 PROCEDURE — 78815 PET IMAGE W/CT SKULL-THIGH: CPT

## 2022-09-09 PROCEDURE — A9552 F18 FDG: HCPCS | Performed by: INTERNAL MEDICINE

## 2022-09-09 PROCEDURE — 0 FLUDEOXYGLUCOSE F18 SOLUTION: Performed by: INTERNAL MEDICINE

## 2022-09-09 RX ADMIN — FLUDEOXYGLUCOSE F18 1 DOSE: 300 INJECTION INTRAVENOUS at 08:37

## 2022-09-12 DIAGNOSIS — R91.1 PULMONARY NODULE: Primary | ICD-10-CM

## 2022-09-12 NOTE — PROGRESS NOTES
I reviewed Ms. James's PET scan with her over the phone today, this showed a negative PET, we will continue to follow the pulmonary nodule closely.  I placed a order for a repeat CT scan to be performed in December for 3-month follow-up.  We will continue to follow this pulmonary nodule for 2 full years.  I did advise her to keep her follow-up appointment in October with Dr. Hopkins.

## 2022-10-05 NOTE — PROGRESS NOTES
Chief complaint/Reason for consult: enlarged thyroid    Consult requested by SHERRY Barros    HPI: Ms. James is a 57-year-old female with COPD, depression, and chronic back pain who comes for evaluation of abnormal thyroid imaging.  Patient was seen by primary care on 7/15/2022 to establish care and at that time was noted to have an unexplained 20 pound weight loss, cough, neck discomfort and palpable neck nodules so had a CT chest abdomen pelvis to further evaluate her unexplained weight loss.  The CT scan done 7/25/2022 showed an appearance of a mild multinodular goiter of the right thyroid and fatty liver changes, but no other acute findings.  She comes today for follow-up of the abnormal thyroid seen on CT scan.    CT scan on 7/25/2022 was personally reviewed and images show an enlarged and heterogenous right-sided thyroid lobe with no appreciable pathologic lymphadenopathy.    Patient denies any history of thyroid dysfunction, labs on 7/15/2022 showed a TSH of 1.67 with free T4 of 0.91.  She has no known history of radiation to the head or neck area, she has no family history of thyroid cancer (she does have a strong family history of hypothyroidism), she has no family history of MEN 2.  She smokes 1 ppd since age 16.     Review of Systems   Constitutional: Positive for fatigue and unexpected weight change (weight loss).   HENT: Positive for hearing loss, sore throat, trouble swallowing and voice change.    Eyes: Positive for photophobia and itching.   Respiratory: Positive for cough, choking, shortness of breath and wheezing.    Cardiovascular: Negative for chest pain and palpitations.   Gastrointestinal: Negative for abdominal pain and nausea.   Endocrine: Positive for polyphagia. Negative for cold intolerance and heat intolerance.   Musculoskeletal: Positive for arthralgias, back pain, myalgias, neck pain and neck stiffness.   Allergic/Immunologic: Positive for environmental allergies.   Neurological:  Positive for light-headedness and headaches.   Psychiatric/Behavioral: Positive for sleep disturbance. Negative for agitation. The patient is nervous/anxious.         Physical Exam  Vitals reviewed.   Constitutional:       General: She is not in acute distress.  HENT:      Head: Normocephalic.   Eyes:      Conjunctiva/sclera: Conjunctivae normal.   Cardiovascular:      Rate and Rhythm: Normal rate.   Pulmonary:      Effort: Pulmonary effort is normal.      Comments: Stigmata of COPD with hyperinflation of the lungs and reduced air movement  Abdominal:      Tenderness: There is no guarding.   Skin:     General: Skin is warm and dry.   Neurological:      General: No focal deficit present.      Mental Status: She is alert.   Psychiatric:         Mood and Affect: Mood normal.         Thought Content: Thought content normal.        Neck Ultrasound Report    Indication: Thyroid nodule    Comparison Imaging: no     Real time high resolution imaging of the thyroid gland was performed in transverse and longitudinal planes.        The right thyroid lobe measured 4.21 cm length x 1.61 cm AP x 2.23 cm in TV dimension. The isthmus measured 1.8 mm in thickness. The left thyroid lobe measured 3.78 cm length x 1.34 cm AP x 1.23 cm in TV dimension.     Lobes: Bilaterally heterogenous with multiple subcentimeter cysts and spongiform nodules     Multiple subcentimeter cysts and spongiform nodules throughout the thyroid with more on the right side.  There was one possible nodule on the left posterior lobe seen in 1 dimension but it was not visible on 2 planes so it could have been just heterogenous thyroid gland.     No pathologic lymph nodes were seen.     Impression: Multinodular goiter with multiple subcentimeter cysts and spongiform nodules throughout, with increased number on the right thyroid lobe.  There was some heterogenous thyroid tissue in the posterior left thyroid lobe with what what appears to be a possible nodule in 1  plane but it was not seen in the second plane so favor heterogenous gland over nodule at this time.    Images saved to PACS.     Ultrasound Guided Thyroid Biopsy not indicated at this time    Assessment and plan:     Diagnoses and all orders for this visit:    1. Thyroid nodule (Primary)  Assessment & Plan:  -Patient with multinodular thyroid gland with multiple subcentimeter nodules that do not meet FNA criteria as detailed above  -The left lobe showed a heterogenous portion on the posterior and inferior aspect which I favored to be heterogenous thyroid tissue at this time as there is no definite nodule in 2 planes  -Encourage patient for smoking cessation  -Recommend she have thyroid function test checked once per year by primary care doctor or more frequently with any unexplained changes in her health  -Recommend she follow-up in 1 year for repeat thyroid ultrasound or sooner with any changes in physical exam, dysphagia, dysphonia or dyspnea    Orders:  -     US Thyroid; Future    2. Tobacco abuse  Assessment & Plan:  -Counseled on the importance of tobacco cessation for both her COPD and her thyroid nodules as smoking is an independent risk factor for thyroid cancer  -Patient willing to attempt smoking cessation  -Patient reports a quit date of this weekend  -Patient failed Chantix in the past due to bad dreams  -Patient would like a trial of Nicorette lozenges, counseled that she must completely avoid tobacco products while using these to avoid excess nicotine dosing  -We will send a starter kit and for the Nicorette lozenges and have her follow-up with her primary care doctor for continued support with smoking cessation    Orders:  -     nicotine polacrilex (Nicorette Mini) 4 MG lozenge; Use as directed, do not smoke while using these  Dispense: 1 each; Refill: 1       Return in about 1 year (around 10/7/2023) for thyroid nodule / ultrasound .     I spent 45 minutes caring for Demetria on this date of service.  This time includes time spent by me in the following activities: preparing for the visit, reviewing tests, performing a medically appropriate examination and/or evaluation, counseling and educating the patient/family/caregiver and documenting information in the medical record this does not include the time spent for the thyroid ultrasound.    Electronically signed by Kyle S Rosenstein, MD

## 2022-10-07 ENCOUNTER — OFFICE VISIT (OUTPATIENT)
Dept: ENDOCRINOLOGY | Facility: CLINIC | Age: 57
End: 2022-10-07

## 2022-10-07 VITALS
SYSTOLIC BLOOD PRESSURE: 152 MMHG | HEIGHT: 64 IN | HEART RATE: 84 BPM | WEIGHT: 112.4 LBS | BODY MASS INDEX: 19.19 KG/M2 | DIASTOLIC BLOOD PRESSURE: 82 MMHG | OXYGEN SATURATION: 98 %

## 2022-10-07 DIAGNOSIS — E04.1 THYROID NODULE: Primary | ICD-10-CM

## 2022-10-07 DIAGNOSIS — Z72.0 TOBACCO ABUSE: ICD-10-CM

## 2022-10-07 PROCEDURE — 99204 OFFICE O/P NEW MOD 45 MIN: CPT | Performed by: HOSPITALIST

## 2022-10-07 PROCEDURE — 76536 US EXAM OF HEAD AND NECK: CPT | Performed by: HOSPITALIST

## 2022-10-07 RX ORDER — POLYETHYLENE GLYCOL 3350 17 G
POWDER IN PACKET (EA) ORAL
Qty: 1 EACH | Refills: 1 | Status: SHIPPED | OUTPATIENT
Start: 2022-10-07 | End: 2023-01-27

## 2022-10-07 NOTE — ASSESSMENT & PLAN NOTE
-Counseled on the importance of tobacco cessation for both her COPD and her thyroid nodules as smoking is an independent risk factor for thyroid cancer  -Patient willing to attempt smoking cessation  -Patient reports a quit date of this weekend  -Patient failed Chantix in the past due to bad dreams  -Patient would like a trial of Nicorette lozenges, counseled that she must completely avoid tobacco products while using these to avoid excess nicotine dosing  -We will send a starter kit and for the Nicorette lozenges and have her follow-up with her primary care doctor for continued support with smoking cessation

## 2022-10-07 NOTE — ASSESSMENT & PLAN NOTE
-Patient with multinodular thyroid gland with multiple subcentimeter nodules that do not meet FNA criteria as detailed above  -The left lobe showed a heterogenous portion on the posterior and inferior aspect which I favored to be heterogenous thyroid tissue at this time as there is no definite nodule in 2 planes  -Encourage patient for smoking cessation  -Recommend she have thyroid function test checked once per year by primary care doctor or more frequently with any unexplained changes in her health  -Recommend she follow-up in 1 year for repeat thyroid ultrasound or sooner with any changes in physical exam, dysphagia, dysphonia or dyspnea

## 2022-12-01 ENCOUNTER — HOSPITAL ENCOUNTER (OUTPATIENT)
Dept: CT IMAGING | Facility: HOSPITAL | Age: 57
Discharge: HOME | End: 2022-12-01
Admitting: NURSE PRACTITIONER

## 2022-12-01 DIAGNOSIS — R91.1 PULMONARY NODULE: ICD-10-CM

## 2022-12-01 PROCEDURE — 71250 CT THORAX DX C-: CPT

## 2022-12-23 ENCOUNTER — DOCUMENTATION (OUTPATIENT)
Dept: SLEEP MEDICINE | Facility: HOSPITAL | Age: 57
End: 2022-12-23

## 2022-12-23 NOTE — PROGRESS NOTES
CT scan of the chest reviewed.  Previously noted right lower lobe pleural-based opacity has completely resolved.  Likely inflammation/atelectasis which has gone away.  Called and left a message for the patient on the phone.  She will need annual CT scan for lung cancer screening due to ongoing smoking and extensive smoking history as well.

## 2023-01-27 ENCOUNTER — OFFICE VISIT (OUTPATIENT)
Dept: FAMILY MEDICINE CLINIC | Facility: CLINIC | Age: 58
End: 2023-01-27
Payer: COMMERCIAL

## 2023-01-27 VITALS
SYSTOLIC BLOOD PRESSURE: 152 MMHG | DIASTOLIC BLOOD PRESSURE: 80 MMHG | OXYGEN SATURATION: 98 % | TEMPERATURE: 97.3 F | HEIGHT: 65 IN | WEIGHT: 115.6 LBS | HEART RATE: 110 BPM | BODY MASS INDEX: 19.26 KG/M2

## 2023-01-27 DIAGNOSIS — H91.92 HEARING LOSS OF LEFT EAR, UNSPECIFIED HEARING LOSS TYPE: ICD-10-CM

## 2023-01-27 DIAGNOSIS — R13.10 DYSPHAGIA, UNSPECIFIED TYPE: Primary | ICD-10-CM

## 2023-01-27 DIAGNOSIS — R42 VERTIGO: ICD-10-CM

## 2023-01-27 DIAGNOSIS — F32.89 OTHER DEPRESSION: ICD-10-CM

## 2023-01-27 PROBLEM — M79.7 FIBROMYALGIA: Status: ACTIVE | Noted: 2022-08-24

## 2023-01-27 PROCEDURE — 99214 OFFICE O/P EST MOD 30 MIN: CPT | Performed by: PHYSICIAN ASSISTANT

## 2023-01-27 RX ORDER — DULOXETIN HYDROCHLORIDE 20 MG/1
CAPSULE, DELAYED RELEASE ORAL
COMMUNITY
End: 2023-01-27

## 2023-01-27 RX ORDER — ARIPIPRAZOLE 5 MG/1
5 TABLET ORAL DAILY
Qty: 30 TABLET | Refills: 5 | Status: SHIPPED | OUTPATIENT
Start: 2023-01-27 | End: 2023-02-19

## 2023-01-31 ENCOUNTER — TELEPHONE (OUTPATIENT)
Dept: FAMILY MEDICINE CLINIC | Facility: CLINIC | Age: 58
End: 2023-01-31

## 2023-01-31 DIAGNOSIS — R11.2 NAUSEA AND VOMITING, UNSPECIFIED VOMITING TYPE: Primary | ICD-10-CM

## 2023-01-31 RX ORDER — PROMETHAZINE HYDROCHLORIDE 25 MG/1
25 TABLET ORAL EVERY 6 HOURS PRN
Qty: 20 TABLET | Refills: 1 | Status: SHIPPED | OUTPATIENT
Start: 2023-01-31

## 2023-01-31 NOTE — PROGRESS NOTES
Follow Up Office Visit      Date: 2023   Patient Name: Demetria James  : 1965   MRN: [id number]     Chief Complaint:    Chief Complaint   Patient presents with   • GI Problem     Diarrhea, sharp pains, hurts the most after pt eats, pt feels like she is going to throw up all the time. Diarrhea comes and goes, but everything she eats she gets gassy and bloated  About 2 weeks   • Anxiety     Pt is having a hard time controlling her emotions, states she is very anxious and had a lot of depression       History of Present Illness: Demetria James is a 57 y.o. female who is here today to follow up with abdominal pain.  She has a lot of GI problems and has been having diarrhea, abdominal pain after she eats feeling nauseated all the time.  Diarrhea comes and goes but every time she eats she gets very bloated.  Over the past couple weeks also she has noted dysphagia.    She has been having a hard time with her emotions as well.  She has been very anxious [employer] depression having problems controlling her emotions.  She does take duloxetine.    She also mentions fairly frequent vertigo and she has noted that she has hearing loss of the left ear.  She has never seen ENT.      Subjective      Review of systems:  Review of Systems   Constitutional: Positive for fatigue. Negative for fever.   HENT: Positive for hearing loss. Negative for congestion, ear pain, postnasal drip, rhinorrhea and trouble swallowing.    Eyes: Negative for visual disturbance.   Respiratory: Negative for cough and shortness of breath.    Cardiovascular: Negative for chest pain and leg swelling.   Gastrointestinal: Positive for abdominal pain, diarrhea and nausea.   Psychiatric/Behavioral: Positive for depressed mood. The patient is nervous/anxious.         I have reviewed and the following portions of the patient's history were updated as appropriate: past family history, past medical history, past social history, past surgical  "history and problem list.    Medications:     Current Outpatient Medications:   •  albuterol sulfate  (90 Base) MCG/ACT inhaler, Inhale 2 puffs Every 6 (Six) Hours As Needed for Wheezing or Shortness of Air., Disp: 18 g, Rfl: 2  •  cyclobenzaprine (FLEXERIL) 10 MG tablet, cyclobenzaprine 10 mg tablet  Take 1 tablet every day by oral route as needed for 30 days., Disp: , Rfl:   •  DULoxetine (CYMBALTA) 60 MG capsule, TAKE ONE CAPSULE BY MOUTH DAILY, Disp: 30 capsule, Rfl: 6  •  gabapentin (NEURONTIN) 600 MG tablet, , Disp: , Rfl:   •  HYDROcodone-acetaminophen (NORCO) 7.5-325 MG per tablet, Take 1 tablet by mouth Every 6 (Six) Hours As Needed for Moderate Pain ., Disp: , Rfl:   •  tiotropium bromide monohydrate (Spiriva Respimat) 2.5 MCG/ACT aerosol solution inhaler, Inhale 2 puffs Daily., Disp: 1 each, Rfl: 6  •  ARIPiprazole (Abilify) 5 MG tablet, Take 1 tablet by mouth Daily., Disp: 30 tablet, Rfl: 5  •  promethazine (PHENERGAN) 25 MG tablet, Take 1 tablet by mouth Every 6 (Six) Hours As Needed for Nausea or Vomiting., Disp: 20 tablet, Rfl: 1    Allergies:   Allergies   Allergen Reactions   • Cephalexin GI Intolerance       Objective     Vital Signs:   Vitals:    01/27/23 1643   BP: 152/80   Pulse: 110   Temp: 97.3 °F (36.3 °C)   TempSrc: Infrared   SpO2: 98%   Weight: 52.4 kg (115 lb 9.6 oz)   Height: 165.1 cm (65\")   PainSc:   6     Body mass index is 19.24 kg/m².   BMI is within normal parameters. No other follow-up for BMI required.      Physical Exam:   Physical Exam  Vitals and nursing note reviewed.   Constitutional:       Appearance: Normal appearance.   HENT:      Head: Normocephalic and atraumatic.      Right Ear: Tympanic membrane and ear canal normal.      Left Ear: Tympanic membrane and ear canal normal.      Nose: No congestion or rhinorrhea.      Mouth/Throat:      Mouth: Mucous membranes are moist.      Pharynx: Oropharynx is clear. No posterior oropharyngeal erythema.   Cardiovascular:      " Rate and Rhythm: Normal rate and regular rhythm.   Pulmonary:      Effort: Pulmonary effort is normal.      Breath sounds: Normal breath sounds. No decreased breath sounds, wheezing, rhonchi or rales.   Abdominal:      Tenderness: There is abdominal tenderness (Epigastric). There is no guarding or rebound.   Musculoskeletal:      Cervical back: Neck supple.      Right lower leg: No edema.      Left lower leg: No edema.   Lymphadenopathy:      Cervical: No cervical adenopathy.   Neurological:      Mental Status: She is alert.          Assessment / Plan      Assessment/Plan:   Diagnoses and all orders for this visit:    1. Dysphagia, unspecified type (Primary)  -     Ambulatory referral for Screening EGD    2. Other depression  -     ARIPiprazole (Abilify) 5 MG tablet; Take 1 tablet by mouth Daily.  Dispense: 30 tablet; Refill: 5    3. Hearing loss of left ear, unspecified hearing loss type  -     Ambulatory Referral to ENT (Otolaryngology)    4. Vertigo  -     Ambulatory Referral to ENT (Otolaryngology)    Will refer to GI for EGD due to dysphagia and ongoing GI symptoms.  Refer to ENT for further evaluation of vertigo episodes as well as hearing loss.  This is concerning for possibly Ménière's disease, especially since this is one-sided.  We will add Abilify 5 mg once daily to her Cymbalta for hopefully helping her depression and anxiety.  Follow-up with me in 1 month to see how she is doing on this sooner if needed.    Follow Up:   Return in about 4 weeks (around 2/24/2023) for 30 minute, Recheck.    Shweta Hawkins PA-C   [facility] [place]

## 2023-01-31 NOTE — TELEPHONE ENCOUNTER
Caller: Demetria James    Relationship: Self    Best call back number: [phone]    What is the best time to reach you: ANYTIME    Who are you requesting to speak with (clinical staff, provider,  specific staff member): VIVEK BANERJEE    Do you know the name of the person who called: SPOUSE/ EARNESTINE    What was the call regarding: VOMITING AND DIARRHEA FOR 24 HOURS    Do you require a callback: YES

## 2023-02-07 DIAGNOSIS — R11.2 NAUSEA AND VOMITING, UNSPECIFIED VOMITING TYPE: Primary | ICD-10-CM

## 2023-02-07 DIAGNOSIS — R10.10 UPPER ABDOMINAL PAIN: ICD-10-CM

## 2023-02-17 RX ORDER — DULOXETIN HYDROCHLORIDE 60 MG/1
CAPSULE, DELAYED RELEASE ORAL
Qty: 30 CAPSULE | Refills: 6 | Status: SHIPPED | OUTPATIENT
Start: 2023-02-17

## 2023-02-23 ENCOUNTER — TELEPHONE (OUTPATIENT)
Dept: FAMILY MEDICINE CLINIC | Facility: CLINIC | Age: 58
End: 2023-02-23

## 2023-02-23 NOTE — TELEPHONE ENCOUNTER
Caller: Demetria James    Relationship: Self    Best call back number: [phone]    What form or medical record are you requesting: WORK EXCUSE 02/19-02/26    Who is requesting this form or medical record from you: SELF    How would you like to receive the form or medical records (pick-up, mail, fax): MYCHART    Timeframe paperwork needed: ASAP    Additional notes:

## 2023-02-24 ENCOUNTER — TELEPHONE (OUTPATIENT)
Dept: FAMILY MEDICINE CLINIC | Facility: CLINIC | Age: 58
End: 2023-02-24
Payer: COMMERCIAL

## 2023-02-24 NOTE — TELEPHONE ENCOUNTER
Looks like she saw our [facility] for her illness - they will need to write the note since they actually saw her and I didn't. There shouldn't be a problem with them doing this.

## 2023-02-24 NOTE — TELEPHONE ENCOUNTER
Hub/front can read       Called Demetria macdonald for pt to call back, left encounter for hub/front to relay message

## 2023-02-24 NOTE — TELEPHONE ENCOUNTER
"Hub/front cab read     \"\"Looks like she saw our [facility] for her illness - they will need to write the note since they actually saw her and I didn't. There shouldn't be a problem with them doing this. \"\"  "

## 2023-02-27 ENCOUNTER — OFFICE VISIT (OUTPATIENT)
Dept: FAMILY MEDICINE CLINIC | Facility: CLINIC | Age: 58
End: 2023-02-27
Payer: COMMERCIAL

## 2023-02-27 VITALS
OXYGEN SATURATION: 98 % | BODY MASS INDEX: 17.83 KG/M2 | HEART RATE: 74 BPM | TEMPERATURE: 98.4 F | HEIGHT: 64 IN | WEIGHT: 104.4 LBS | SYSTOLIC BLOOD PRESSURE: 130 MMHG | DIASTOLIC BLOOD PRESSURE: 88 MMHG

## 2023-02-27 DIAGNOSIS — J18.9 PNEUMONIA OF RIGHT LUNG DUE TO INFECTIOUS ORGANISM, UNSPECIFIED PART OF LUNG: Primary | ICD-10-CM

## 2023-02-27 PROBLEM — E66.3 OVERWEIGHT: Status: ACTIVE | Noted: 2023-02-14

## 2023-02-27 PROCEDURE — 99213 OFFICE O/P EST LOW 20 MIN: CPT | Performed by: PHYSICIAN ASSISTANT

## 2023-02-27 RX ORDER — ALBUTEROL SULFATE 90 UG/1
2 AEROSOL, METERED RESPIRATORY (INHALATION) EVERY 6 HOURS PRN
Qty: 18 G | Refills: 2 | Status: SHIPPED | OUTPATIENT
Start: 2023-02-27

## 2023-02-27 RX ORDER — ARIPIPRAZOLE 5 MG/1
5 TABLET ORAL
COMMUNITY
Start: 2023-02-24 | End: 2023-02-27

## 2023-02-27 NOTE — PROGRESS NOTES
Answers for HPI/ROS submitted by the patient on 2023  What is the primary reason for your visit?: Cough  Chronicity: recurrent  Onset: in the past 7 days  Progression since onset: gradually improving  Frequency: hourly  Cough characteristics: productive of sputum  chest pain: Yes  chills: No  ear congestion: Yes  ear pain: Yes  fever: No  headaches: No  heartburn: No  hemoptysis: No  myalgias: Yes  nasal congestion: Yes  postnasal drip: Yes  rash: No  rhinorrhea: Yes  shortness of breath: Yes  sore throat: No  sweats: No  weight loss: Yes  wheezing: Yes  Aggravated by: lying down  Risk factors for lung disease: smoking/tobacco exposure         Follow Up Office Visit      Date: 2023   Patient Name: Demetria James  : 1965   MRN: [id number]     Chief Complaint:    Chief Complaint   Patient presents with   • Abdominal Pain     Pt was supposed to have an ultra sound of her gallbladder but couldn't do it bc of pneumonia  Pt just wanted to have you listen to her lungs and talk to her about the scan coming up       History of Present Illness: Demetria James is a 57 y.o. female who is here today to follow up with abdominal pain.  She was scheduled to have her ultrasound done last week but unfortunately she developed pneumonia and had to cancel this.  It is rescheduled for next Friday.  She has been on Augmentin and doxycycline and does have diarrhea from these.  However she is feeling better.  Last week she felt terrible took the whole week off.  Still having a lot of coughing and congestion and fatigue.  She is scheduled to have her EGD this Friday but is wondering about this and she is still sick.      Subjective      Review of systems:  Review of Systems   Constitutional: Positive for unexpected weight loss. Negative for chills and fever.   HENT: Positive for ear pain, postnasal drip and rhinorrhea. Negative for sore throat.    Respiratory: Positive for shortness of breath and wheezing.     Cardiovascular: Positive for chest pain.   Musculoskeletal: Positive for myalgias.   Skin: Negative for rash.        I have reviewed and the following portions of the patient's history were updated as appropriate: past family history, past medical history, past social history, past surgical history and problem list.    Medications:     Current Outpatient Medications:   •  albuterol sulfate  (90 Base) MCG/ACT inhaler, Inhale 2 puffs Every 6 (Six) Hours As Needed for Wheezing or Shortness of Air., Disp: 18 g, Rfl: 2  •  amoxicillin-clavulanate (AUGMENTIN) 875-125 MG per tablet, Take 1 tablet by mouth 2 (Two) Times a Day for 10 days. Take one twice a day with food, Disp: 20 tablet, Rfl: 0  •  cyclobenzaprine (FLEXERIL) 10 MG tablet, cyclobenzaprine 10 mg tablet  Take 1 tablet every day by oral route as needed for 30 days., Disp: , Rfl:   •  doxycycline (MONODOX) 100 MG capsule, Take 1 capsule by mouth 2 (Two) Times a Day., Disp: 20 capsule, Rfl: 0  •  DULoxetine (CYMBALTA) 60 MG capsule, TAKE ONE CAPSULE BY MOUTH DAILY, Disp: 30 capsule, Rfl: 6  •  gabapentin (NEURONTIN) 600 MG tablet, , Disp: , Rfl:   •  HYDROcodone-acetaminophen (NORCO) 7.5-325 MG per tablet, Take 1 tablet by mouth Every 6 (Six) Hours As Needed for Moderate Pain ., Disp: , Rfl:   •  promethazine (PHENERGAN) 25 MG tablet, Take 1 tablet by mouth Every 6 (Six) Hours As Needed for Nausea or Vomiting., Disp: 20 tablet, Rfl: 1  •  promethazine-dextromethorphan (PROMETHAZINE-DM) 6.25-15 MG/5ML syrup, Take 5 mL by mouth 4 (Four) Times a Day As Needed for Cough., Disp: 118 mL, Rfl: 0  •  tiotropium bromide monohydrate (Spiriva Respimat) 2.5 MCG/ACT aerosol solution inhaler, Inhale 2 puffs Daily., Disp: 1 each, Rfl: 6    Allergies:   Allergies   Allergen Reactions   • Cephalexin GI Intolerance       Objective     Vital Signs:   Vitals:    02/27/23 1632   BP: 130/88   Pulse: 74   Temp: 98.4 °F (36.9 °C)   TempSrc: Infrared   SpO2: 98%   Weight: 47.4 kg  "(104 lb 6.4 oz)   Height: 162.6 cm (64.02\")   PainSc:   2     Body mass index is 17.91 kg/m².   BMI is below normal parameters (malnutrition). Recommendations: treating the underlying disease process      Physical Exam:   Physical Exam  Vitals and nursing note reviewed.   Constitutional:       Appearance: Normal appearance.   HENT:      Head: Normocephalic and atraumatic.   Cardiovascular:      Rate and Rhythm: Normal rate and regular rhythm.   Pulmonary:      Breath sounds: Wheezing and rhonchi present.   Musculoskeletal:      Cervical back: Neck supple.      Right lower leg: No edema.      Left lower leg: No edema.   Neurological:      Mental Status: She is alert.          Assessment / Plan      Assessment/Plan:   Diagnoses and all orders for this visit:    1. Pneumonia of right lung due to infectious organism, unspecified part of lung (Primary)  -     albuterol sulfate  (90 Base) MCG/ACT inhaler; Inhale 2 puffs Every 6 (Six) Hours As Needed for Wheezing or Shortness of Air.  Dispense: 18 g; Refill: 2    She still has rhonchi and wheezing bilaterally.  Pneumonia was in the right lung and she continues to take her antibiotics.  Albuterol HFA renewed.  Also sample given a Spiriva as she is out of this.  Since the EGD require sedation we decided to push this forward a couple of weeks just to be safe with her breathing issues.  We will await the results of her abdominal ultrasound that will be done next week and I will see her back after the EGD to discuss both.    Follow Up:   No follow-ups on file.    Shweta Hawkins PA-C   [facility] [place]  "

## 2023-03-06 ENCOUNTER — HOSPITAL ENCOUNTER (OUTPATIENT)
Dept: ULTRASOUND IMAGING | Facility: HOSPITAL | Age: 58
Discharge: HOME | End: 2023-03-06
Admitting: PHYSICIAN ASSISTANT
Payer: COMMERCIAL

## 2023-03-06 DIAGNOSIS — R11.2 NAUSEA AND VOMITING, UNSPECIFIED VOMITING TYPE: ICD-10-CM

## 2023-03-06 DIAGNOSIS — R10.10 UPPER ABDOMINAL PAIN: ICD-10-CM

## 2023-03-06 PROCEDURE — 76705 ECHO EXAM OF ABDOMEN: CPT

## 2023-03-15 DIAGNOSIS — J40 BRONCHITIS: ICD-10-CM

## 2023-03-15 DIAGNOSIS — R10.10 UPPER ABDOMINAL PAIN: Primary | ICD-10-CM

## 2023-03-15 RX ORDER — DOXYCYCLINE HYCLATE 100 MG/1
100 CAPSULE ORAL 2 TIMES DAILY
Qty: 20 CAPSULE | Refills: 0 | Status: SHIPPED | OUTPATIENT
Start: 2023-03-15 | End: 2023-03-25

## 2023-07-17 NOTE — TELEPHONE ENCOUNTER
Caller: Demetria James    Relationship: Self    Best call back number: [phone]    Caller requesting test results: SELF    What test was performed: CT SCAN    When was the test performed: Monday MORNING    Where was the test performed: [facility] beds at the [facility]    Additional notes: CHECKING TO SEE IF YOU HAVE RESULTS YET            Patient does not have atrial fibrillation/flutter

## 2023-09-14 RX ORDER — DULOXETIN HYDROCHLORIDE 60 MG/1
CAPSULE, DELAYED RELEASE ORAL
Qty: 90 CAPSULE | Refills: 1 | Status: SHIPPED | OUTPATIENT
Start: 2023-09-14

## 2023-10-12 ENCOUNTER — OFFICE VISIT (OUTPATIENT)
Dept: ENDOCRINOLOGY | Facility: CLINIC | Age: 58
End: 2023-10-12
Payer: COMMERCIAL

## 2023-10-12 VITALS
OXYGEN SATURATION: 99 % | WEIGHT: 112 LBS | HEIGHT: 64 IN | DIASTOLIC BLOOD PRESSURE: 72 MMHG | SYSTOLIC BLOOD PRESSURE: 138 MMHG | BODY MASS INDEX: 19.12 KG/M2 | HEART RATE: 95 BPM

## 2023-10-12 DIAGNOSIS — E04.1 THYROID NODULE: Primary | ICD-10-CM

## 2023-10-12 NOTE — PROGRESS NOTES
Chief complaint/Reason for consult: Thyroid nodules    HPI: Patient is a 58year old female here for follow-up of a thyroid nodule.     # Thyroid nodules:   - First noted as an incidental finding on CT scan 7/2022  - Last thyroid ultrasound: 10/7/2022 showing a heterogenous gland with multiple subcentimeter cysts and spongiform nodules  - Prior FNA's: None  - Reports choking on food more since last visit  - Denies history of radiation to the head/neck  - No known family history of thyroid cancer  - Patient reports continued tobacco use    - TSH 1.670 done 7/15/2022    Review of Systems   Constitutional:  Negative for activity change and unexpected weight change.   HENT:  Positive for trouble swallowing. Negative for voice change.    Eyes:  Negative for visual disturbance.   Respiratory:  Negative for shortness of breath.    Cardiovascular:  Negative for chest pain.   Gastrointestinal:  Negative for abdominal pain.   Endocrine: Negative for cold intolerance and heat intolerance.   Musculoskeletal:  Positive for neck pain.   Skin:  Negative for rash.   Neurological:  Negative for numbness.   Psychiatric/Behavioral:  Negative for agitation and confusion.         Physical Exam  Vitals reviewed.   Constitutional:       General: She is not in acute distress.  HENT:      Head: Normocephalic.      Nose: Nose normal.   Eyes:      Conjunctiva/sclera: Conjunctivae normal.   Cardiovascular:      Rate and Rhythm: Normal rate.      Pulses: Normal pulses.   Pulmonary:      Effort: Pulmonary effort is normal.   Abdominal:      Tenderness: There is no guarding.   Musculoskeletal:         General: Normal range of motion.      Cervical back: Neck supple.   Skin:     General: Skin is warm and dry.   Neurological:      Mental Status: She is alert and oriented to person, place, and time.   Psychiatric:         Mood and Affect: Mood normal.         Behavior: Behavior normal.         Thought Content: Thought content normal.          Neck  Ultrasound Report    Indication: Thyroid nodule    Comparison Imaging: yes     Real time high resolution imaging of the thyroid gland was performed in transverse and longitudinal planes.        The right thyroid lobe measured 4.45 cm length x 1.45 cm AP x 2.39 cm in TV dimension. The isthmus measured 1.7 mm in thickness. The left thyroid lobe measured 3.80 cm length x 0.85 cm AP x 1.64 cm in TV dimension.     Lobes: Right lobe slightly more heterogenous than the left lobe     Multiple subcentimeter nodules and cysts, more on the right than left with the largest being a spongiform nodule measuring 0.69 x 0.54 x 0.83 cm in the right mid thyroid lobe.     No pathologic lymph nodes were seen.     Impression: Slightly heterogenous thyroid gland with multiple subcentimeter cysts and spongiform nodules, the largest measuring 0.83 cm in the right mid thyroid lobe that does not meet criteria for FNA    Images saved to PACS.     Assessment and plan:     Diagnoses and all orders for this visit:    1. Thyroid nodule (Primary)  Assessment & Plan:  -Patient with persistent subcentimeter cysts and spongiform nodules that do not meet criteria for FNA with background heterogenous thyroid gland  -Recommend smoking cessation  -Recommend discussion with PCP to further evaluate dysphagia  -Repeat thyroid ultrasound in 1 year or sooner with new onset or changing compressive symptoms or change in physical exam    Orders:  -     US Thyroid; Future         Return in about 1 year (around 10/12/2024) for Thyroid nodule.     Electronically signed by Kyle S Rosenstein, MD

## 2023-10-12 NOTE — ASSESSMENT & PLAN NOTE
-Patient with persistent subcentimeter cysts and spongiform nodules that do not meet criteria for FNA with background heterogenous thyroid gland  -Recommend smoking cessation  -Recommend discussion with PCP to further evaluate dysphagia  -Repeat thyroid ultrasound in 1 year or sooner with new onset or changing compressive symptoms or change in physical exam

## 2024-03-15 ENCOUNTER — OFFICE VISIT (OUTPATIENT)
Dept: FAMILY MEDICINE CLINIC | Facility: CLINIC | Age: 59
End: 2024-03-15
Payer: COMMERCIAL

## 2024-03-15 ENCOUNTER — LAB (OUTPATIENT)
Dept: LAB | Facility: HOSPITAL | Age: 59
End: 2024-03-15
Payer: COMMERCIAL

## 2024-03-15 VITALS
HEIGHT: 64 IN | BODY MASS INDEX: 18.44 KG/M2 | OXYGEN SATURATION: 96 % | HEART RATE: 59 BPM | TEMPERATURE: 98.4 F | WEIGHT: 108 LBS | SYSTOLIC BLOOD PRESSURE: 170 MMHG | DIASTOLIC BLOOD PRESSURE: 90 MMHG | RESPIRATION RATE: 21 BRPM

## 2024-03-15 DIAGNOSIS — H92.02 LEFT EAR PAIN: Primary | ICD-10-CM

## 2024-03-15 DIAGNOSIS — H92.02 LEFT EAR PAIN: ICD-10-CM

## 2024-03-15 DIAGNOSIS — Z00.00 ROUTINE MEDICAL EXAM: ICD-10-CM

## 2024-03-15 DIAGNOSIS — I10 PRIMARY HYPERTENSION: ICD-10-CM

## 2024-03-15 DIAGNOSIS — H91.92 HEARING LOSS OF LEFT EAR, UNSPECIFIED HEARING LOSS TYPE: ICD-10-CM

## 2024-03-15 PROBLEM — F32.A CHRONIC DEPRESSION: Status: ACTIVE | Noted: 2018-03-14

## 2024-03-15 PROBLEM — M16.11 ARTHRITIS OF RIGHT HIP: Status: ACTIVE | Noted: 2023-04-11

## 2024-03-15 PROBLEM — M47.812 CERVICAL SPONDYLOSIS: Status: ACTIVE | Noted: 2023-04-11

## 2024-03-15 PROCEDURE — 83036 HEMOGLOBIN GLYCOSYLATED A1C: CPT

## 2024-03-15 PROCEDURE — 80050 GENERAL HEALTH PANEL: CPT

## 2024-03-15 PROCEDURE — 80061 LIPID PANEL: CPT

## 2024-03-15 RX ORDER — VENLAFAXINE HYDROCHLORIDE 75 MG/1
CAPSULE, EXTENDED RELEASE ORAL
COMMUNITY
End: 2024-03-15

## 2024-03-15 RX ORDER — CARVEDILOL 12.5 MG/1
12.5 TABLET ORAL 2 TIMES DAILY
COMMUNITY
End: 2024-03-15

## 2024-03-15 RX ORDER — HYDROCODONE BITARTRATE AND ACETAMINOPHEN 7.5; 325 MG/1; MG/1
1 TABLET ORAL 3 TIMES DAILY
COMMUNITY

## 2024-03-15 RX ORDER — ZOLPIDEM TARTRATE 10 MG/1
TABLET ORAL
COMMUNITY
End: 2024-03-15

## 2024-03-15 RX ORDER — ARIPIPRAZOLE 5 MG/1
TABLET ORAL
COMMUNITY
End: 2024-03-15

## 2024-03-15 RX ORDER — LISINOPRIL 10 MG/1
10 TABLET ORAL DAILY
Qty: 30 TABLET | Refills: 2 | Status: SHIPPED | OUTPATIENT
Start: 2024-03-15

## 2024-03-15 NOTE — PROGRESS NOTES
Established Patient Office Visit        Subjective      Chief Complaint:  Hearing Problem (Ear and hearing issues, mostly in left ear and moving to the right ear, headache, feeling off balance )      History of Present Illness: Demetria James is a 58 y.o. female who presents for left ear pain.    Decreased hearing to the left ear with pain to the left ear. Started several weeks ago got worse gradually. First had hearing loss to the left ear about a year ago. Trouble with balance about a week ago and still present.     Patient Active Problem List   Diagnosis    Depression    Hypertension    Arthralgia of temporomandibular joint    Primary insomnia    Pain in multiple finger joints    Atypical mole    Bronchitis    Anxiety    Chronic pain disorder    Degeneration of lumbar intervertebral disc    Encounter for long-term (current) use of insulin    Idiopathic peripheral neuropathy    Lumbar spondylosis    Lumbosacral spondylosis without myelopathy    Trochanteric bursitis of right hip    Sacroiliac joint pain    Pain of right hip joint    Thyroid nodule    Tobacco abuse    Fibromyalgia    Overweight    Arthritis of right hip    Chronic depression    Essential hypertension    Cervical spondylosis         Current Outpatient Medications:     albuterol sulfate  (90 Base) MCG/ACT inhaler, Inhale 2 puffs Every 6 (Six) Hours As Needed for Wheezing or Shortness of Air., Disp: 18 g, Rfl: 2    DULoxetine (CYMBALTA) 60 MG capsule, TAKE ONE CAPSULE BY MOUTH DAILY, Disp: 90 capsule, Rfl: 1    gabapentin (NEURONTIN) 600 MG tablet, 0.5 tablet every night., Disp: , Rfl:     HYDROcodone-acetaminophen (NORCO) 7.5-325 MG per tablet, Take 1 tablet by mouth 3 (Three) Times a Day., Disp: , Rfl:     promethazine-dextromethorphan (PROMETHAZINE-DM) 6.25-15 MG/5ML syrup, Take 5 mL by mouth 4 (Four) Times a Day As Needed for Cough., Disp: 118 mL, Rfl: 0    lisinopril (PRINIVIL,ZESTRIL) 10 MG tablet, Take 1 tablet by mouth Daily., Disp:  "30 tablet, Rfl: 2    promethazine (PHENERGAN) 25 MG tablet, Take 1 tablet by mouth Every 6 (Six) Hours As Needed for Nausea or Vomiting. (Patient not taking: Reported on 3/15/2024), Disp: 20 tablet, Rfl: 1       Objective     Physical Exam:   Vital Signs:   BP (!) 186/90 (BP Location: Left arm, Patient Position: Sitting, Cuff Size: Adult)   Pulse 59   Temp 98.4 °F (36.9 °C) (Temporal)   Resp 21   Ht 162.6 cm (64\")   Wt 49 kg (108 lb)   SpO2 96%   BMI 18.54 kg/m²      Physical Exam  Constitutional:       General: She is not in acute distress.     Appearance: She is not ill-appearing.   Cardiovascular:      Rate and Rhythm: Normal rate and regular rhythm.   Pulmonary:      Effort: Pulmonary effort is normal.      Breath sounds: Normal breath sounds.   Neurological:      Mental Status: She is alert. CN II-XII intact with exception of decreased hearing to the left ear.  Normal strength sensation upper lower extremity bilaterally.  Normal finger-nose  Psychiatric:         Thought Content: Thought content normal.   Tympanic membrane within normal's bilaterally  No tenderness around the TMJ         Assessment / Plan      Assessment/Plan:   Diagnoses and all orders for this visit:    1. Left ear pain (Primary)  -     MRI Brain With & Without Contrast; Future    2. Hearing loss of left ear, unspecified hearing loss type  -     MRI Brain With & Without Contrast; Future    3. Primary hypertension  Assessment & Plan:  Chronic worsening she previously was on lisinopril HCTZ and carvedilol and had hypotension but has not restarted on antihypertensives medication    Restart lisinopril today check labs    Orders:  -     Comprehensive Metabolic Panel; Future  -     Lipid Panel; Future  -     Hemoglobin A1c; Future  -     CBC (No Diff); Future  -     TSH Rfx On Abnormal To Free T4; Future  -     lisinopril (PRINIVIL,ZESTRIL) 10 MG tablet; Take 1 tablet by mouth Daily.  Dispense: 30 tablet; Refill: 2    4. Routine medical " exam  -     Comprehensive Metabolic Panel; Future  -     Lipid Panel; Future  -     Hemoglobin A1c; Future  -     CBC (No Diff); Future  -     TSH Rfx On Abnormal To Free T4; Future       Left ear pain and worsening hearing loss left ear warrants MRI.  Reschedule ENT appointment for eval.   Start Flonase to see if this could be related to eustachian tube dysfunction  Cannot tolerate oral steroids given worsening hypertension    Due to discussed screening testing and wellness visit follow-up in a month for that and blood pressure recheck  Follow Up:   Return in about 1 month (around 4/15/2024) for Wellness visit.    MDM:     Mitesh Cano MD  Family Medicine - [place] [facility] [place]

## 2024-03-15 NOTE — ASSESSMENT & PLAN NOTE
Chronic worsening she previously was on lisinopril HCTZ and carvedilol and had hypotension but has not restarted on antihypertensives medication    Restart lisinopril today check labs

## 2024-03-16 LAB
ALBUMIN SERPL-MCNC: 4.3 G/DL (ref 3.5–5.2)
ALBUMIN/GLOB SERPL: 1.7 G/DL
ALP SERPL-CCNC: 139 U/L (ref 39–117)
ALT SERPL W P-5'-P-CCNC: 9 U/L (ref 1–33)
ANION GAP SERPL CALCULATED.3IONS-SCNC: 12.5 MMOL/L (ref 5–15)
AST SERPL-CCNC: 17 U/L (ref 1–32)
BILIRUB SERPL-MCNC: 0.3 MG/DL (ref 0–1.2)
BUN SERPL-MCNC: 8 MG/DL (ref 6–20)
BUN/CREAT SERPL: 9.9 (ref 7–25)
CALCIUM SPEC-SCNC: 9.7 MG/DL (ref 8.6–10.5)
CHLORIDE SERPL-SCNC: 103 MMOL/L (ref 98–107)
CHOLEST SERPL-MCNC: 204 MG/DL (ref 0–200)
CO2 SERPL-SCNC: 25.5 MMOL/L (ref 22–29)
CREAT SERPL-MCNC: 0.81 MG/DL (ref 0.57–1)
DEPRECATED RDW RBC AUTO: 43.1 FL (ref 37–54)
EGFRCR SERPLBLD CKD-EPI 2021: 83.7 ML/MIN/1.73
ERYTHROCYTE [DISTWIDTH] IN BLOOD BY AUTOMATED COUNT: 12.9 % (ref 12.3–15.4)
GLOBULIN UR ELPH-MCNC: 2.6 GM/DL
GLUCOSE SERPL-MCNC: 88 MG/DL (ref 65–99)
HBA1C MFR BLD: 5.3 % (ref 4.8–5.6)
HCT VFR BLD AUTO: 46.9 % (ref 34–46.6)
HDLC SERPL-MCNC: 44 MG/DL (ref 40–60)
HGB BLD-MCNC: 15.9 G/DL (ref 12–15.9)
LDLC SERPL CALC-MCNC: 128 MG/DL (ref 0–100)
LDLC/HDLC SERPL: 2.82 {RATIO}
MCH RBC QN AUTO: 31.1 PG (ref 26.6–33)
MCHC RBC AUTO-ENTMCNC: 33.9 G/DL (ref 31.5–35.7)
MCV RBC AUTO: 91.6 FL (ref 79–97)
PLATELET # BLD AUTO: 293 10*3/MM3 (ref 140–450)
PMV BLD AUTO: 9.7 FL (ref 6–12)
POTASSIUM SERPL-SCNC: 4 MMOL/L (ref 3.5–5.2)
PROT SERPL-MCNC: 6.9 G/DL (ref 6–8.5)
RBC # BLD AUTO: 5.12 10*6/MM3 (ref 3.77–5.28)
SODIUM SERPL-SCNC: 141 MMOL/L (ref 136–145)
TRIGL SERPL-MCNC: 179 MG/DL (ref 0–150)
TSH SERPL DL<=0.05 MIU/L-ACNC: 3.14 UIU/ML (ref 0.27–4.2)
VLDLC SERPL-MCNC: 32 MG/DL (ref 5–40)
WBC NRBC COR # BLD AUTO: 8.47 10*3/MM3 (ref 3.4–10.8)

## 2024-03-18 RX ORDER — DULOXETIN HYDROCHLORIDE 60 MG/1
60 CAPSULE, DELAYED RELEASE ORAL DAILY
Qty: 90 CAPSULE | Refills: 1 | Status: SHIPPED | OUTPATIENT
Start: 2024-03-18

## 2024-04-26 ENCOUNTER — PATIENT MESSAGE (OUTPATIENT)
Dept: FAMILY MEDICINE CLINIC | Facility: CLINIC | Age: 59
End: 2024-04-26
Payer: COMMERCIAL

## 2024-06-21 DIAGNOSIS — I10 PRIMARY HYPERTENSION: ICD-10-CM

## 2024-06-21 RX ORDER — LISINOPRIL 10 MG/1
10 TABLET ORAL DAILY
Qty: 30 TABLET | Refills: 0 | Status: SHIPPED | OUTPATIENT
Start: 2024-06-21

## 2024-07-29 DIAGNOSIS — I10 PRIMARY HYPERTENSION: ICD-10-CM

## 2024-07-29 RX ORDER — LISINOPRIL 10 MG/1
10 TABLET ORAL DAILY
Qty: 30 TABLET | Refills: 0 | Status: SHIPPED | OUTPATIENT
Start: 2024-07-29

## 2024-09-23 RX ORDER — DULOXETIN HYDROCHLORIDE 60 MG/1
60 CAPSULE, DELAYED RELEASE ORAL DAILY
Qty: 90 CAPSULE | Refills: 0 | Status: SHIPPED | OUTPATIENT
Start: 2024-09-23

## 2024-10-14 DIAGNOSIS — I10 PRIMARY HYPERTENSION: ICD-10-CM

## 2024-10-15 RX ORDER — LISINOPRIL 10 MG/1
10 TABLET ORAL DAILY
Qty: 30 TABLET | Refills: 0 | Status: SHIPPED | OUTPATIENT
Start: 2024-10-15

## 2024-11-18 DIAGNOSIS — I10 PRIMARY HYPERTENSION: ICD-10-CM

## 2024-11-19 RX ORDER — LISINOPRIL 10 MG/1
10 TABLET ORAL DAILY
Qty: 30 TABLET | Refills: 0 | Status: SHIPPED | OUTPATIENT
Start: 2024-11-19